# Patient Record
Sex: FEMALE | Race: WHITE | Employment: OTHER | URBAN - METROPOLITAN AREA
[De-identification: names, ages, dates, MRNs, and addresses within clinical notes are randomized per-mention and may not be internally consistent; named-entity substitution may affect disease eponyms.]

---

## 2017-03-02 ENCOUNTER — TRANSFERRED RECORDS (OUTPATIENT)
Dept: HEALTH INFORMATION MANAGEMENT | Facility: CLINIC | Age: 72
End: 2017-03-02

## 2017-03-20 ENCOUNTER — ONCOLOGY VISIT (OUTPATIENT)
Dept: ONCOLOGY | Facility: CLINIC | Age: 72
End: 2017-03-20
Attending: INTERNAL MEDICINE
Payer: MEDICARE

## 2017-03-20 ENCOUNTER — HOSPITAL ENCOUNTER (OUTPATIENT)
Facility: CLINIC | Age: 72
Setting detail: SPECIMEN
Discharge: HOME | End: 2017-03-20
Attending: INTERNAL MEDICINE | Admitting: INTERNAL MEDICINE
Payer: MEDICARE

## 2017-03-20 DIAGNOSIS — Z85.3 PERSONAL HISTORY OF MALIGNANT NEOPLASM OF BREAST: ICD-10-CM

## 2017-03-20 LAB
ALBUMIN SERPL-MCNC: 3.9 G/DL (ref 3.4–5)
ALP SERPL-CCNC: 133 U/L (ref 40–150)
ALT SERPL W P-5'-P-CCNC: 33 U/L (ref 0–50)
AST SERPL W P-5'-P-CCNC: 19 U/L (ref 0–45)
BILIRUB DIRECT SERPL-MCNC: <0.1 MG/DL (ref 0–0.2)
BILIRUB SERPL-MCNC: 0.3 MG/DL (ref 0.2–1.3)
PROT SERPL-MCNC: 6.7 G/DL (ref 6.8–8.8)

## 2017-03-20 PROCEDURE — 80076 HEPATIC FUNCTION PANEL: CPT | Performed by: INTERNAL MEDICINE

## 2017-03-20 PROCEDURE — 86300 IMMUNOASSAY TUMOR CA 15-3: CPT | Performed by: INTERNAL MEDICINE

## 2017-03-20 PROCEDURE — 36415 COLL VENOUS BLD VENIPUNCTURE: CPT

## 2017-03-20 NOTE — PROGRESS NOTES
Medical Assistant Note:  Capri Chacon presents today for labs.    Patient seen by provider today: No.   present during visit today: Not Applicable.    Concerns: No Concerns.    Procedure:  Lab draw site: LAC, Needle type: BF, Gauge: 21.    Post Assessment:  Labs drawn without difficulty: Yes.    Discharge Plan:  Departure Mode: Ambulatory.    Shirley Hernandez

## 2017-03-20 NOTE — MR AVS SNAPSHOT
After Visit Summary   3/20/2017    Capri Chacon    MRN: [id number]           Patient Information     Date Of Birth          1945        Visit Information        Provider Department      3/20/2017 8:15 AM Nurse, [facility]        Today's Diagnoses     Personal history of malignant neoplasm of breast           Follow-ups after your visit        Your next 10 appointments already scheduled     Mar 22, 2017  8:45 AM CDT   Return Visit with Kristan Lim MD   [facility] ([employer])    [facility]  [address]  [facility] [id number] [phone]              Who to contact     If you have questions or need follow up information about today's clinic visit or your schedule please contact [facility] directly at [phone].  Normal or non-critical lab and imaging results will be communicated to you by MyChart, letter or phone within 4 business days after the clinic has received the results. If you do not hear from us within 7 days, please contact the clinic through MyChart or phone. If you have a critical or abnormal lab result, we will notify you by phone as soon as possible.  Submit refill requests through BancABC or call your pharmacy and they will forward the refill request to us. Please allow 3 business days for your refill to be completed.          Additional Information About Your Visit        MyChart Information     BancABC gives you secure access to your electronic health record. If you see a primary care provider, you can also send messages to your care team and make appointments. If you have questions, please call your primary care clinic.  If you do not have a primary care provider, please call [phone] and they will assist you.        Care EveryWhere ID     This is your Care EveryWhere ID. This could be used by other organizations to access your [facility]  records  [id number]        Your Vitals Were     Last Period                   01/01/2000            Blood Pressure from Last 3 Encounters:   09/21/16 141/73   03/16/16 147/77   09/16/15 140/80    Weight from Last 3 Encounters:   09/21/16 64 kg (141 lb)   03/16/16 65.1 kg (143 lb 9.6 oz)   09/16/15 65.2 kg (143 lb 12.8 oz)              We Performed the Following     Ca27.29  breast tumor marker     Hepatic panel        Primary Care Provider Office Phone # Fax #    Aissatou Kay [phone]       [facility] [address] [facility] [address]        Thank you!     Thank you for choosing [facility]  for your care. Our goal is always to provide you with excellent care. Hearing back from our patients is one way we can continue to improve our services. Please take a few minutes to complete the written survey that you may receive in the mail after your visit with us. Thank you!             Your Updated Medication List - Protect others around you: Learn how to safely use, store and throw away your medicines at www.disposemymeds.org.          This list is accurate as of: 3/20/17  8:16 AM.  Always use your most recent med list.                   Brand Name Dispense Instructions for use    ADVAIR HFA IN      Inhale into the lungs daily       anastrozole 1 MG tablet    ARIMIDEX    90 tablet    Take 1 tablet (1 mg) by mouth daily       ASPIRIN PO      Take 81 mg by mouth daily       LOPRESSOR PO      Take 50 mg by mouth daily       simvastatin 40 MG tablet    ZOCOR     Take 20 mg by mouth At Bedtime

## 2017-03-21 LAB — CANCER AG27-29 SERPL-ACNC: 9 U/ML (ref 0–39)

## 2017-04-05 ENCOUNTER — ONCOLOGY VISIT (OUTPATIENT)
Dept: ONCOLOGY | Facility: CLINIC | Age: 72
End: 2017-04-05
Attending: INTERNAL MEDICINE
Payer: MEDICARE

## 2017-04-05 VITALS
TEMPERATURE: 98.1 F | SYSTOLIC BLOOD PRESSURE: 147 MMHG | HEART RATE: 89 BPM | BODY MASS INDEX: 27.87 KG/M2 | DIASTOLIC BLOOD PRESSURE: 76 MMHG | WEIGHT: 138 LBS | RESPIRATION RATE: 16 BRPM | OXYGEN SATURATION: 94 %

## 2017-04-05 DIAGNOSIS — E66.3 OVERWEIGHT: ICD-10-CM

## 2017-04-05 DIAGNOSIS — M85.80 OSTEOPENIA: ICD-10-CM

## 2017-04-05 DIAGNOSIS — Z85.3 PERSONAL HISTORY OF MALIGNANT NEOPLASM OF BREAST: Primary | ICD-10-CM

## 2017-04-05 PROCEDURE — 99211 OFF/OP EST MAY X REQ PHY/QHP: CPT

## 2017-04-05 PROCEDURE — 99214 OFFICE O/P EST MOD 30 MIN: CPT | Performed by: INTERNAL MEDICINE

## 2017-04-05 ASSESSMENT — PAIN SCALES - GENERAL: PAINLEVEL: NO PAIN (0)

## 2017-04-05 NOTE — MR AVS SNAPSHOT
After Visit Summary   4/5/2017    Capri Chacon    MRN: [id number]           Patient Information     Date Of Birth          1945        Visit Information        Provider Department      4/5/2017 2:45 PM Kristan Lim MD [facility]        Today's Diagnoses     Personal history of malignant neoplasm of breast    -  1    Osteopenia        Overweight          Care Instructions    6 months f/u with labs.   Obtain dexa from outside.         Follow-ups after your visit        Your next 10 appointments already scheduled     Oct 05, 2017 12:30 PM CDT   Return Visit with  Oncology Nurse   [facility] ([facility])    [facility]  [address]  [place] [address] [phone]            Oct 05, 2017  1:00 PM CDT   Return Visit with Kristan Lim MD   [facility] ([facility])    [facility]  [address]  [place] [address] [phone]              Future tests that were ordered for you today     Open Future Orders        Priority Expected Expires Ordered    Ca27.29  breast tumor marker Routine 10/1/2017 11/30/2017 4/5/2017    CBC with platelets differential Routine 10/1/2017 11/30/2017 4/5/2017    Hepatic panel Routine 10/1/2017 11/30/2017 4/5/2017            Who to contact     If you have questions or need follow up information about today's clinic visit or your schedule please contact [facility] directly at [phone].  Normal or non-critical lab and imaging results will be communicated to you by MyChart, letter or phone within 4 business days after the clinic has received the results. If you do not hear from us within 7 days, please contact the clinic through Sha-Shahart or phone. If you have a critical or abnormal lab result, we will notify you by phone as soon as possible.  Submit refill requests through [email or web address] or call your pharmacy and they will  forward the refill request to us. Please allow 3 business days for your refill to be completed.          Additional Information About Your Visit        BioSTLharHashtrack Information     SingOn gives you secure access to your electronic health record. If you see a primary care provider, you can also send messages to your care team and make appointments. If you have questions, please call your primary care clinic.  If you do not have a primary care provider, please call [phone] and they will assist you.        Care EveryWhere ID     This is your Care EveryWhere ID. This could be used by other organizations to access your [place] medical records  [id number]        Your Vitals Were     Pulse Temperature Respirations Last Period Pulse Oximetry BMI (Body Mass Index)    89 98.1  F (36.7  C) (Oral) 16 01/01/2000 94% 27.87 kg/m2       Blood Pressure from Last 3 Encounters:   04/05/17 147/76   09/21/16 141/73   03/16/16 147/77    Weight from Last 3 Encounters:   04/05/17 62.6 kg (138 lb)   09/21/16 64 kg (141 lb)   03/16/16 65.1 kg (143 lb 9.6 oz)               Primary Care Provider Office Phone # Fax #    Aissatou CHINO Rahul [phone]       [facility] [address] [facility] [address]        Thank you!     Thank you for choosing [facility]  for your care. Our goal is always to provide you with excellent care. Hearing back from our patients is one way we can continue to improve our services. Please take a few minutes to complete the written survey that you may receive in the mail after your visit with us. Thank you!             Your Updated Medication List - Protect others around you: Learn how to safely use, store and throw away your medicines at www.disposemymeds.org.          This list is accurate as of: 4/5/17 11:59 PM.  Always use your most recent med list.                   Brand Name Dispense Instructions for use    ADVAIR HFA IN      Inhale into the lungs daily        anastrozole 1 MG tablet    ARIMIDEX    90 tablet    Take 1 tablet (1 mg) by mouth daily       ASPIRIN PO      Take 81 mg by mouth daily       LOPRESSOR PO      Take 50 mg by mouth daily       simvastatin 40 MG tablet    ZOCOR     Take 20 mg by mouth At Bedtime

## 2017-04-05 NOTE — PROGRESS NOTES
"Oncology follow up visit  CC: right breast cancer T1b triple positive  HPI: She had undergone routine screening mammogram on 03/07/2012 demonstrating a suspicious lesion in the right breast. Biopsy confirmed infiltrating ductal adenocarcinoma. She underwent a right mastectomy on 04/11/2012 demonstrating a 0.9 x 0.7 cm, grade 2 infiltrating ductal adenocarcinoma. Four examined lymph nodes were negative for metastatic disease. There was no lymphovascular invasion or DCIS. Estrogen and progesterone receptors were strongly positive. HER-2/zechariah was positive by FISH with a ratio of 6.1.     She went on to receive 6 cycles of TCH chemotherapy from 05/13/2012 through 08/16/2012. She then completed a full year of Herceptin on 04/25/2013.   She was placed on anastrozole on 09/20/2012 because of hormone positive disease.       PMH:  bilateral carpal tunnel syndrome. This is being observed at the present time without surgery planned.   Osteopenia, HTN and hyperlipidemia, asthma, left breast mastectomy in 1996 for \"micalcification\" according to pt    ROS:   she is tolerating Arimidex well without added pain to her preexisting arthritis. She has episodic pain on right frontal ankle. No focal weakness or bleeding issues.     PHYSICAL EXAMINATION:   GENERAL: Capri is well.   VITAL SIGNS: Blood pressure 147/76, pulse 89, temperature 98.1  F (36.7  C), temperature source Oral, resp. rate 16, weight 62.6 kg (138 lb), last menstrual period 01/01/2000, SpO2 94 %.  HEENT: The patient is normocephalic. Pupils are equal, round, and react to light and accommodation.   NECK: Supple. No JVD  LYMPH NODES: No anterior cervical, posterior cervical, supraclavicular, axillary or inguinal adenopathy.   LUNGS: Normal breath sounds to auscultation and percussion. No rhonchi or rales.   BACK: No CVA or vertebral tenderness.   HEART: Regular rhythm without murmurs or gallops.   BREASTS: Bilateral mastectomies scars well healed  ABDOMEN: Without " hepatosplenomegaly, masses, or tenderness. The patient has normal bowel sounds. No rigidity, guarding or tenderness.   EXTREMITIES: The patient has full range of motion. There is no lymphedema.   NEUROLOGIC: Cranial nerves II-XII are intact. Sensation is intact    CURRENT LAB DATA  3/2017  LFT, [id number]. LFT are fine      CURRENT IMAGE   3/2017 dexa from outside: nl.     OLD DATA REVIEWED WITH SUMMARY  2/2016 CXR nl  2/2015 dexa: borderline osteopenia  2012 dexa: osteopenia      A/P  1. Right breast cancer in 2012, T1b, triple positive, s/p mastectomy, [facility], currently on Arimidex since 2012.  We talked about the side effects of this and how she should be monitored.     She is due 6 months f/u with labs.     I shared with her the MA 17R data from [facility] 2016. She could be a good candidate for long term anti hormone therapy. She is open for this.     We talked about the recurrence pattern associated with ER+ breast cancer and the follow up plan. She is informed on the purpose of blood work along with her visit.     2. Osteopenia. She finished 6 doses of prolia. She is advised on vit D proper dose. She had another dexa spring 2017 from outside which was nl. Will get the result.     3. Overweight. Weight loss counseling is provided. She is on top of this.

## 2017-04-05 NOTE — PROGRESS NOTES
"Capri Chacon is a 71 year old female who presents for:  Chief Complaint   Patient presents with     Oncology Clinic Visit     F/U Breast Ca         Initial Vitals:  /76  Pulse 89  Temp 98.1  F (36.7  C) (Oral)  Resp 16  Wt 62.6 kg (138 lb)  LMP 01/01/2000  SpO2 94%  BMI 27.87 kg/m2 Estimated body mass index is 27.87 kg/(m^2) as calculated from the following:    Height as of 11/13/14: 1.499 m (4' 11\").    Weight as of this encounter: 62.6 kg (138 lb).. Body surface area is 1.61 meters squared. BP completed using cuff size: regular  No Pain (0) Patient's last menstrual period was 01/01/2000. Allergies and medications reviewed.     Medications: MEDICATION REFILLS NEEDED TODAY.  Pharmacy name entered into [employer]: [facility] - [place], [place] - [address] AT PORTAL TO REGISTERED [facility] SITES    Comments: Needs Arimidex refill    6 minutes for nursing intake (face to face time)   Shirley Hernandez CMA    DISCHARGE PLAN:  1.) Patient to be scheduled for labs and follow up in 6 months with Dr. Lim.   2.) Bone density report to be obtaind. Bone density obtained through care everywhere and pasted below.   Next appointments: See patient instruction section  Departure Mode: Ambulatory  Accompanied by: sister  8 minutes for nursing discharge (face to face time)   Aissatou Jones RN      IMPRESSION:     Normal bone mineral density in the lumbar spine and hip. Findings in the left femoral neck fall just short of meeting criteria for osteopenia.          WHO Classification of bone density for post-menopausal women, and men over 50 years of age, is as follows:    Normal bone density: T score greater than -1.0.  Osteopenia (Low bone density): T score between -1.0 and -2.5.  Osteoporosis: T score less than -2.5.     Result Narrative   EXAM: DEXA Bone Density Study     DATE: 3/2/2017 10:06 AM    COMPARISON: 3/2/2015    CLINICAL DATA: Osteoporosis screening. Post-menopausal.    TECHNIQUE: A bone " density evaluation using DEXA (dual energy x-ray absorptiometry) was performed for your patient at the [facility] in [place] using a Hologic (Discovery) unit.     FINDINGS:     Lumbar Spine:    Average BMD (g/cm2): 1.052    T-score: 0.0    Z-score: 2.2      Left Femoral Neck:    Average BMD (g/cm2): 0.739    T-score: -1.0    Z-score: 0.8      Left Hip Total:    Average BMD (g/cm2): 0.893    T-score: -0.4    Z-score: 1.1      Vertebral assessment: frontal and lateral scanograms demonstrate no compression deformities in the thoracic or lumbar spine. Compared to the previous study, the measured bone density in the lumbar spine has decreased by 4% and the measured bone density in the left hip is unchanged.    FRAX (10-year Fracture Risk calculated for an untreated patient): not reported in patients being treated for low bone mineral density.

## 2017-10-11 ENCOUNTER — HOSPITAL ENCOUNTER (OUTPATIENT)
Facility: CLINIC | Age: 72
Setting detail: SPECIMEN
Discharge: HOME | End: 2017-10-11
Attending: INTERNAL MEDICINE | Admitting: INTERNAL MEDICINE
Payer: MEDICARE

## 2017-10-11 ENCOUNTER — ONCOLOGY VISIT (OUTPATIENT)
Dept: ONCOLOGY | Facility: CLINIC | Age: 72
End: 2017-10-11
Attending: INTERNAL MEDICINE
Payer: MEDICARE

## 2017-10-11 VITALS
TEMPERATURE: 98.5 F | BODY MASS INDEX: 27.63 KG/M2 | SYSTOLIC BLOOD PRESSURE: 151 MMHG | WEIGHT: 136.8 LBS | OXYGEN SATURATION: 99 % | HEART RATE: 66 BPM | DIASTOLIC BLOOD PRESSURE: 90 MMHG

## 2017-10-11 DIAGNOSIS — Z87.39 HISTORY OF OSTEOPENIA: ICD-10-CM

## 2017-10-11 DIAGNOSIS — E66.3 OVERWEIGHT: ICD-10-CM

## 2017-10-11 DIAGNOSIS — Z85.3 PERSONAL HISTORY OF MALIGNANT NEOPLASM OF BREAST: Primary | ICD-10-CM

## 2017-10-11 DIAGNOSIS — Z85.3 PERSONAL HISTORY OF MALIGNANT NEOPLASM OF BREAST: ICD-10-CM

## 2017-10-11 LAB
ALBUMIN SERPL-MCNC: 3.8 G/DL (ref 3.4–5)
ALP SERPL-CCNC: 110 U/L (ref 40–150)
ALT SERPL W P-5'-P-CCNC: 28 U/L (ref 0–50)
AST SERPL W P-5'-P-CCNC: 16 U/L (ref 0–45)
BASOPHILS # BLD AUTO: 0 10E9/L (ref 0–0.2)
BASOPHILS NFR BLD AUTO: 0.1 %
BILIRUB DIRECT SERPL-MCNC: <0.1 MG/DL (ref 0–0.2)
BILIRUB SERPL-MCNC: 0.4 MG/DL (ref 0.2–1.3)
CANCER AG27-29 SERPL-ACNC: 8 U/ML (ref 0–39)
DIFFERENTIAL METHOD BLD: NORMAL
EOSINOPHIL # BLD AUTO: 0.5 10E9/L (ref 0–0.7)
EOSINOPHIL NFR BLD AUTO: 6 %
ERYTHROCYTE [DISTWIDTH] IN BLOOD BY AUTOMATED COUNT: 13.5 % (ref 10–15)
HCT VFR BLD AUTO: 39.4 % (ref 35–47)
HGB BLD-MCNC: 13.5 G/DL (ref 11.7–15.7)
IMM GRANULOCYTES # BLD: 0 10E9/L (ref 0–0.4)
IMM GRANULOCYTES NFR BLD: 0.3 %
LYMPHOCYTES # BLD AUTO: 2.2 10E9/L (ref 0.8–5.3)
LYMPHOCYTES NFR BLD AUTO: 28 %
MCH RBC QN AUTO: 31.3 PG (ref 26.5–33)
MCHC RBC AUTO-ENTMCNC: 34.3 G/DL (ref 31.5–36.5)
MCV RBC AUTO: 91 FL (ref 78–100)
MONOCYTES # BLD AUTO: 0.7 10E9/L (ref 0–1.3)
MONOCYTES NFR BLD AUTO: 8.4 %
NEUTROPHILS # BLD AUTO: 4.6 10E9/L (ref 1.6–8.3)
NEUTROPHILS NFR BLD AUTO: 57.2 %
NRBC # BLD AUTO: 0 10*3/UL
NRBC BLD AUTO-RTO: 0 /100
PLATELET # BLD AUTO: 249 10E9/L (ref 150–450)
PROT SERPL-MCNC: 6.7 G/DL (ref 6.8–8.8)
RBC # BLD AUTO: 4.31 10E12/L (ref 3.8–5.2)
WBC # BLD AUTO: 8 10E9/L (ref 4–11)

## 2017-10-11 PROCEDURE — 80076 HEPATIC FUNCTION PANEL: CPT | Performed by: INTERNAL MEDICINE

## 2017-10-11 PROCEDURE — 86300 IMMUNOASSAY TUMOR CA 15-3: CPT | Performed by: INTERNAL MEDICINE

## 2017-10-11 PROCEDURE — 99214 OFFICE O/P EST MOD 30 MIN: CPT | Performed by: INTERNAL MEDICINE

## 2017-10-11 PROCEDURE — 85025 COMPLETE CBC W/AUTO DIFF WBC: CPT | Performed by: INTERNAL MEDICINE

## 2017-10-11 PROCEDURE — 99211 OFF/OP EST MAY X REQ PHY/QHP: CPT

## 2017-10-11 PROCEDURE — 36415 COLL VENOUS BLD VENIPUNCTURE: CPT

## 2017-10-11 RX ORDER — ANASTROZOLE 1 MG/1
1 TABLET ORAL DAILY
Qty: 90 TABLET | Refills: 3 | Status: SHIPPED | OUTPATIENT
Start: 2017-10-11 | End: 2019-01-15

## 2017-10-11 ASSESSMENT — PAIN SCALES - GENERAL: PAINLEVEL: NO PAIN (0)

## 2017-10-11 NOTE — PROGRESS NOTES
"Oncology Rooming Note    October 11, 2017 10:26 AM   Capri Chacon is a 71 year old female who presents for:    Chief Complaint   Patient presents with     Oncology Clinic Visit     Initial Vitals: /90 (BP Location: Left arm, Patient Position: Chair, Cuff Size: Adult Regular)  Pulse 66  Temp 98.5  F (36.9  C) (Oral)  Wt 62.1 kg (136 lb 12.8 oz)  LMP 01/01/2000  SpO2 99%  BMI 27.63 kg/m2 Estimated body mass index is 27.63 kg/(m^2) as calculated from the following:    Height as of 11/13/14: 1.499 m (4' 11\").    Weight as of this encounter: 62.1 kg (136 lb 12.8 oz). Body surface area is 1.61 meters squared.  No Pain (0) Comment: Data Unavailable   Patient's last menstrual period was 01/01/2000.  Allergies reviewed: Yes  Medications reviewed: Yes    Medications: MEDICATION REFILLS NEEDED TODAY. Provider was notified. ANASTROZOLE REFILL NEEDED TODAY.  Pharmacy name entered into [employer]: [facility] - [place], [place] - [address] AT PORTAL TO [facility] SITES    Clinical concerns: None     5 minutes for nursing intake (face to face time)     Mirela Bailey CMA            DISCHARGE PLAN:  Next appointments: See patient instruction section. Pt instructions reviewed with pt. Pt brought to [facility] to schedule lab and follow up.  Departure Mode: Ambulatory  Accompanied by: self  3 minutes for nursing discharge (face to face time)   Mirela Bailey CMA        1 yr f/u with labs  Scheduled/Odette VILLEGAS printed and given to patient/Odette        10/10/2018 Wed  8:30 AM  8:30 A 15 [facility] [[id number]] SH ONCOLOGY NURSE [[id number]] RETURN [657] Peripheral Labs       10/10/2018 Wed  9:00 AM  9:00 A 15 [facility] [[id number]] JOSE L FALK [[id number]] RETURN [657] 1 year follow up -Breast Cancer       "

## 2017-10-11 NOTE — MR AVS SNAPSHOT
After Visit Summary   10/11/2017    Capri Chacon    MRN: [id number]           Patient Information     Date Of Birth          1945        Visit Information        Provider Department      10/11/2017 10:15 AM Kristan Lim MD [facility]        Today's Diagnoses     Personal history of malignant neoplasm of breast    -  1    Overweight        History of osteopenia          Care Instructions    1 yr f/u with labs          Follow-ups after your visit        Your next 10 appointments already scheduled     Oct 10, 2018  8:30 AM CDT   Return Visit with  Oncology Nurse   [facility] ([facility])    [facility]  [address]  [place] [address]   [phone]            Oct 10, 2018  9:00 AM CDT   Return Visit with Kristan Lim MD   [facility] ([employer])    [facility] [place]  [address]  [place] [address]   [phone]              Future tests that were ordered for you today     Open Future Orders        Priority Expected Expires Ordered    Ca27.29  breast tumor marker Routine 9/1/2018 10/11/2018 10/11/2017    CBC with platelets differential Routine 9/1/2018 10/11/2018 10/11/2017    Comprehensive metabolic panel Routine 9/1/2018 10/11/2018 10/11/2017            Who to contact     If you have questions or need follow up information about today's clinic visit or your schedule please contact [facility] directly at [phone].  Normal or non-critical lab and imaging results will be communicated to you by MyChart, letter or phone within 4 business days after the clinic has received the results. If you do not hear from us within 7 days, please contact the clinic through Agilis Biotherapeuticshart or phone. If you have a critical or abnormal lab result, we will notify you by phone as soon as possible.  Submit refill requests through Parature or call your pharmacy and they  will forward the refill request to us. Please allow 3 business days for your refill to be completed.          Additional Information About Your Visit        Tritonhart Information     BetterPet gives you secure access to your electronic health record. If you see a primary care provider, you can also send messages to your care team and make appointments. If you have questions, please call your primary care clinic.  If you do not have a primary care provider, please call [phone] and they will assist you.        Care EveryWhere ID     This is your Care EveryWhere ID. This could be used by other organizations to access your [place] medical records  [id number]        Your Vitals Were     Pulse Temperature Last Period Pulse Oximetry BMI (Body Mass Index)       66 98.5  F (36.9  C) (Oral) 01/01/2000 99% 27.63 kg/m2        Blood Pressure from Last 3 Encounters:   10/11/17 151/90   04/05/17 147/76   09/21/16 141/73    Weight from Last 3 Encounters:   10/11/17 62.1 kg (136 lb 12.8 oz)   04/05/17 62.6 kg (138 lb)   09/21/16 64 kg (141 lb)                 Where to get your medicines      [facility] [place] [place] - [address] AT Portal to [facility]  [address], [facility] [address]     Phone:  [phone]     anastrozole 1 MG tablet          Primary Care Provider Office Phone # Fax #    Aissatou CHINO Rahul [phone]       [facility] [address] [place] [address]        Equal Access to Services     JESSICA REYES : Hadii jamey tapia hadasho Soomaali, waaxda luqadaha, qaybta kaalmada lillie, bret de guzman. So [facility] [phone].    ATENCIÓN: Si habla español, tiene a gustafson disposición servicios gratuitos de asistencia lingüística. Llame al [phone].    We comply with applicable federal civil rights laws and [place] laws. We do not discriminate on the basis of race, color,  national origin, age, disability, sex, sexual orientation, or gender identity.            Thank you!     Thank you for choosing [facility]  for your care. Our goal is always to provide you with excellent care. Hearing back from our patients is one way we can continue to improve our services. Please take a few minutes to complete the written survey that you may receive in the mail after your visit with us. Thank you!             Your Updated Medication List - Protect others around you: Learn how to safely use, store and throw away your medicines at www.disposemymeds.org.          This list is accurate as of: 10/11/17 10:48 AM.  Always use your most recent med list.                   Brand Name Dispense Instructions for use Diagnosis    ADVAIR HFA IN      Inhale into the lungs daily        anastrozole 1 MG tablet    ARIMIDEX    90 tablet    Take 1 tablet (1 mg) by mouth daily    Personal history of malignant neoplasm of breast       ASPIRIN PO      Take 81 mg by mouth daily        LOPRESSOR PO      Take 50 mg by mouth daily        simvastatin 40 MG tablet    ZOCOR     Take 20 mg by mouth At Bedtime

## 2017-10-11 NOTE — PROGRESS NOTES
"Oncology follow up visit  CC: right breast cancer T1b triple positive  HPI: She had undergone routine screening mammogram on 03/07/2012 demonstrating a suspicious lesion in the right breast. Biopsy confirmed infiltrating ductal adenocarcinoma. She underwent a right mastectomy on 04/11/2012 demonstrating a 0.9 x 0.7 cm, grade 2 infiltrating ductal adenocarcinoma. Four examined lymph nodes were negative for metastatic disease. There was no lymphovascular invasion or DCIS. Estrogen and progesterone receptors were strongly positive. HER-2/zechariah was positive by FISH with a ratio of 6.1.     She went on to receive 6 cycles of TCH chemotherapy from 05/13/2012 through 08/16/2012. She then completed a full year of Herceptin on 04/25/2013.   She was placed on anastrozole on 09/20/2012 because of hormone positive disease.   She made informed decision to proceed with longer anti hormone therapy in 2017.     PMH:  bilateral carpal tunnel syndrome. This is being observed at the present time without surgery planned.   Osteopenia, HTN and hyperlipidemia, asthma, left breast mastectomy in 1996 for \"micalcification\" according to pt    ROS:   she is tolerating Arimidex well without added pain to her preexisting arthritis. She has episodic pain on right frontal ankle. No focal weakness or bleeding issues.     PHYSICAL EXAMINATION:   GENERAL: Capri is well.   VITAL SIGNS: Blood pressure 151/90, pulse 66, temperature 98.5  F (36.9  C), temperature source Oral, weight 62.1 kg (136 lb 12.8 oz), last menstrual period 01/01/2000, SpO2 99 %.  HEENT: The patient is normocephalic. Pupils are equal, round, and react to light and accommodation.   NECK: Supple. No JVD  LYMPH NODES: No anterior cervical, posterior cervical, supraclavicular, axillary or inguinal adenopathy.   LUNGS: Normal breath sounds to auscultation and percussion. No rhonchi or rales.   BACK: No CVA or vertebral tenderness.   HEART: Regular rhythm without murmurs or gallops. "   BREASTS: Bilateral mastectomies scars well healed  ABDOMEN: Without hepatosplenomegaly, masses, or tenderness. The patient has normal bowel sounds. No rigidity, guarding or tenderness.   EXTREMITIES: The patient has full range of motion. There is no lymphedema.   NEUROLOGIC: Cranial nerves II-XII are intact. Sensation is intact    CURRENT LAB DATA REVIEWED  10/2017  LFT, Cl5426. LFT are fine      CURRENT IMAGE REVIEWED  3/2017 dexa from outside: nl.     OLD DATA REVIEWED WITH SUMMARY  2/2016 CXR nl  2/2015 dexa: borderline osteopenia  2012 dexa: osteopenia      A/P  1. Right breast cancer in 2012, T1b, triple positive, s/p mastectomy, TC, currently on Arimidex since 2012.  We talked about the side effects of this and how she should be monitored.     She is due 12 months f/u with labs.     I shared with her the long term anti hormone data. She could be a good candidate for long term anti hormone therapy. She is open for this.   She is willing to do that.     We talked about the recurrence pattern associated with ER+ breast cancer and the follow up plan. She is informed on the purpose of blood work along with her visit.     2. HX OF Osteopenia. She finished 6 doses of prolia. She is advised on vit D proper dose. She had another dexa spring 2017 from outside which was nl. Will get the result.     3. Overweight. Weight loss counseling is provided. She is on top of this.

## 2017-10-11 NOTE — PROGRESS NOTES
Medical Assistant Note:  Capri Chacon presents today for labs.    Patient seen by provider today: Yes: GE.   present during visit today: Not Applicable.    Concerns: No Concerns.    Procedure:  Lab draw site: LAC, Needle type: BF, Gauge: 21.    Post Assessment:  Labs drawn without difficulty: Yes.    Discharge Plan:  Pt tolerated procedure well. Gauze and coban applied.    Face to Face Time: 8min.    Letitia Lemon MA

## 2017-10-11 NOTE — MR AVS SNAPSHOT
After Visit Summary   10/11/2017    Capri Chacon    MRN: [id number]           Patient Information     Date Of Birth          1945        Visit Information        Provider Department      10/11/2017 9:45 AM Nurse,  Oncology [facility]        Today's Diagnoses     Personal history of malignant neoplasm of breast           Follow-ups after your visit        Your next 10 appointments already scheduled     Oct 11, 2017  9:45 AM CDT   Return Visit with  Oncology Nurse   [facility] ([facility])    [facility]  [address]  [facility] [address]   [phone]            Oct 11, 2017 10:15 AM CDT   Return Visit with Kristan iLm MD   [facility] ([facility])    [facility]  [address]  [facility] [address] [phone]              Who to contact     If you have questions or need follow up information about today's clinic visit or your schedule please contact [facility] directly at [phone].  Normal or non-critical lab and imaging results will be communicated to you by [employer], letter or phone within 4 business days after the clinic has received the results. If you do not hear from us within 7 days, please contact the clinic through [employer] or phone. If you have a critical or abnormal lab result, we will notify you by phone as soon as possible.  Submit refill requests through [employer] or call your pharmacy and they will forward the refill request to us. Please allow 3 business days for your refill to be completed.          Additional Information About Your Visit        [employer] Information     [employer] gives you secure access to your electronic health record. If you see a primary care provider, you can also send messages to your care team and make appointments. If you have questions, please call your primary care clinic.  If you do not have a  primary care provider, please call [phone] and they will assist you.        Care EveryWhere ID     This is your Care EveryWhere ID. This could be used by other organizations to access your [place] medical records  [id number]        Your Vitals Were     Last Period                   01/01/2000            Blood Pressure from Last 3 Encounters:   04/05/17 147/76   09/21/16 141/73   03/16/16 147/77    Weight from Last 3 Encounters:   04/05/17 62.6 kg (138 lb)   09/21/16 64 kg (141 lb)   03/16/16 65.1 kg (143 lb 9.6 oz)              We Performed the Following     Ca27.29  breast tumor marker     CBC with platelets differential     Hepatic panel        Primary Care Provider Office Phone # Fax #    Aissatou Kay [phone]       [facility] [address] [facility] [address]        Equal Access to Services     YASMEEN REYES : Hadii aad ku hadasho Soomaali, waaxda luqadaha, qaybta kaalmada adeegyada, waxay idiin haybarryn roro solis . So [facility] [phone].    ATENCIÓN: Si habla español, tiene a gustafson disposición servicios gratuitos de asistencia lingüística. Russel al [phone].    We comply with applicable federal civil rights laws and [place] laws. We do not discriminate on the basis of race, color, national origin, age, disability, sex, sexual orientation, or gender identity.            Thank you!     Thank you for choosing [facility]  for your care. Our goal is always to provide you with excellent care. Hearing back from our patients is one way we can continue to improve our services. Please take a few minutes to complete the written survey that you may receive in the mail after your visit with us. Thank you!             Your Updated Medication List - Protect others around you: Learn how to safely use, store and throw away your medicines at www.disposemymeds.org.          This list is accurate as of: 10/11/17  9:42 AM.  Always use your most recent  med list.                   Brand Name Dispense Instructions for use Diagnosis    ADVAIR HFA IN      Inhale into the lungs daily        anastrozole 1 MG tablet    ARIMIDEX    90 tablet    Take 1 tablet (1 mg) by mouth daily    Personal history of malignant neoplasm of breast       ASPIRIN PO      Take 81 mg by mouth daily        LOPRESSOR PO      Take 50 mg by mouth daily        simvastatin 40 MG tablet    ZOCOR     Take 20 mg by mouth At Bedtime

## 2018-01-24 ENCOUNTER — TELEPHONE (OUTPATIENT)
Dept: ONCOLOGY | Facility: CLINIC | Age: 73
End: 2018-01-24

## 2018-01-24 NOTE — TELEPHONE ENCOUNTER
Capri would like a new script for 12 bras and bilateral prosthesis faxed to [facility] at [phone].    2/2/- Patient called, she is aware that script for bras and breast prothesis were faxed to [facility]. Aissatou Jones RN

## 2018-02-02 DIAGNOSIS — Z90.11 ABSENCE OF RIGHT BREAST: Primary | ICD-10-CM

## 2018-10-10 ENCOUNTER — ONCOLOGY VISIT (OUTPATIENT)
Dept: ONCOLOGY | Facility: CLINIC | Age: 73
End: 2018-10-10
Attending: INTERNAL MEDICINE
Payer: MEDICARE

## 2018-10-10 ENCOUNTER — HOSPITAL ENCOUNTER (OUTPATIENT)
Facility: CLINIC | Age: 73
Setting detail: SPECIMEN
Discharge: HOME | End: 2018-10-10
Attending: INTERNAL MEDICINE | Admitting: INTERNAL MEDICINE
Payer: MEDICARE

## 2018-10-10 VITALS
WEIGHT: 140.2 LBS | RESPIRATION RATE: 16 BRPM | SYSTOLIC BLOOD PRESSURE: 195 MMHG | DIASTOLIC BLOOD PRESSURE: 73 MMHG | BODY MASS INDEX: 28.32 KG/M2 | TEMPERATURE: 97.9 F | OXYGEN SATURATION: 94 % | HEART RATE: 55 BPM

## 2018-10-10 DIAGNOSIS — Z87.39 HISTORY OF OSTEOPENIA: ICD-10-CM

## 2018-10-10 DIAGNOSIS — I10 ESSENTIAL HYPERTENSION: ICD-10-CM

## 2018-10-10 DIAGNOSIS — I10 HTN (HYPERTENSION): Primary | ICD-10-CM

## 2018-10-10 DIAGNOSIS — Z85.3 PERSONAL HISTORY OF MALIGNANT NEOPLASM OF BREAST: ICD-10-CM

## 2018-10-10 DIAGNOSIS — Z85.3 PERSONAL HISTORY OF MALIGNANT NEOPLASM OF BREAST: Primary | ICD-10-CM

## 2018-10-10 LAB
ALBUMIN SERPL-MCNC: 3.9 G/DL (ref 3.4–5)
ALP SERPL-CCNC: 84 U/L (ref 40–150)
ALT SERPL W P-5'-P-CCNC: 28 U/L (ref 0–50)
ANION GAP SERPL CALCULATED.3IONS-SCNC: 5 MMOL/L (ref 3–14)
AST SERPL W P-5'-P-CCNC: 19 U/L (ref 0–45)
BASOPHILS # BLD AUTO: 0 10E9/L (ref 0–0.2)
BASOPHILS NFR BLD AUTO: 0.4 %
BILIRUB SERPL-MCNC: 0.6 MG/DL (ref 0.2–1.3)
BUN SERPL-MCNC: 23 MG/DL (ref 7–30)
CALCIUM SERPL-MCNC: 8.9 MG/DL (ref 8.5–10.1)
CANCER AG27-29 SERPL-ACNC: 10 U/ML (ref 0–39)
CHLORIDE SERPL-SCNC: 105 MMOL/L (ref 94–109)
CO2 SERPL-SCNC: 30 MMOL/L (ref 20–32)
CREAT SERPL-MCNC: 0.83 MG/DL (ref 0.52–1.04)
DIFFERENTIAL METHOD BLD: NORMAL
EOSINOPHIL # BLD AUTO: 0.5 10E9/L (ref 0–0.7)
EOSINOPHIL NFR BLD AUTO: 5.5 %
ERYTHROCYTE [DISTWIDTH] IN BLOOD BY AUTOMATED COUNT: 13.2 % (ref 10–15)
GFR SERPL CREATININE-BSD FRML MDRD: 68 ML/MIN/1.7M2
GLUCOSE SERPL-MCNC: 108 MG/DL (ref 70–99)
HCT VFR BLD AUTO: 39.1 % (ref 35–47)
HGB BLD-MCNC: 13 G/DL (ref 11.7–15.7)
IMM GRANULOCYTES # BLD: 0 10E9/L (ref 0–0.4)
IMM GRANULOCYTES NFR BLD: 0.2 %
LYMPHOCYTES # BLD AUTO: 2.5 10E9/L (ref 0.8–5.3)
LYMPHOCYTES NFR BLD AUTO: 29 %
MCH RBC QN AUTO: 30.9 PG (ref 26.5–33)
MCHC RBC AUTO-ENTMCNC: 33.2 G/DL (ref 31.5–36.5)
MCV RBC AUTO: 93 FL (ref 78–100)
MONOCYTES # BLD AUTO: 0.8 10E9/L (ref 0–1.3)
MONOCYTES NFR BLD AUTO: 9.1 %
NEUTROPHILS # BLD AUTO: 4.7 10E9/L (ref 1.6–8.3)
NEUTROPHILS NFR BLD AUTO: 55.8 %
NRBC # BLD AUTO: 0 10*3/UL
NRBC BLD AUTO-RTO: 0 /100
PLATELET # BLD AUTO: 232 10E9/L (ref 150–450)
POTASSIUM SERPL-SCNC: 4 MMOL/L (ref 3.4–5.3)
PROT SERPL-MCNC: 6.5 G/DL (ref 6.8–8.8)
RBC # BLD AUTO: 4.21 10E12/L (ref 3.8–5.2)
SODIUM SERPL-SCNC: 140 MMOL/L (ref 133–144)
WBC # BLD AUTO: 8.4 10E9/L (ref 4–11)

## 2018-10-10 PROCEDURE — 99215 OFFICE O/P EST HI 40 MIN: CPT | Performed by: INTERNAL MEDICINE

## 2018-10-10 PROCEDURE — G0463 HOSPITAL OUTPT CLINIC VISIT: HCPCS

## 2018-10-10 PROCEDURE — 86300 IMMUNOASSAY TUMOR CA 15-3: CPT | Performed by: INTERNAL MEDICINE

## 2018-10-10 PROCEDURE — 80053 COMPREHEN METABOLIC PANEL: CPT | Performed by: INTERNAL MEDICINE

## 2018-10-10 PROCEDURE — 85025 COMPLETE CBC W/AUTO DIFF WBC: CPT | Performed by: INTERNAL MEDICINE

## 2018-10-10 PROCEDURE — 36415 COLL VENOUS BLD VENIPUNCTURE: CPT

## 2018-10-10 RX ORDER — FUROSEMIDE 20 MG
20 TABLET ORAL ONCE
Qty: 1 TABLET | Refills: 0 | Status: SHIPPED | OUTPATIENT
Start: 2018-10-10 | End: 2020-04-10

## 2018-10-10 RX ORDER — AMLODIPINE BESYLATE 5 MG/1
5 TABLET ORAL ONCE
Qty: 1 TABLET | Refills: 0 | Status: SHIPPED | OUTPATIENT
Start: 2018-10-10 | End: 2018-10-10 | Stop reason: DRUGHIGH

## 2018-10-10 ASSESSMENT — PAIN SCALES - GENERAL: PAINLEVEL: NO PAIN (0)

## 2018-10-10 NOTE — MR AVS SNAPSHOT
After Visit Summary   10/10/2018    Capri Chacon    MRN: [id number]           Patient Information     Date Of Birth          1945        Visit Information        Provider Department      10/10/2018 9:00 AM Kristan Lim MD [facility]        Today's Diagnoses     Personal history of malignant neoplasm of breast    -  1    History of osteopenia        Essential hypertension          Care Instructions    BP too high to be discharged. Please recheck. DONE , patient aware to f/u with primary clinic for BP check CY  1 yr f/u with labs. Scheduled/[place]     AVS printed and given to patient/ Latnoia           Follow-ups after your visit        Your next 10 appointments already scheduled     Oct 09, 2019  8:30 AM CDT   Return Visit with  Oncology Nurse   [facility] ([facility])    [facility]  [address]  [place] [address]   [phone]            Oct 09, 2019  9:40 AM CDT   Return Visit with Kristan Lim MD   [facility] ([facility])    [facility]  [address]  [place] [address]   [phone]              Future tests that were ordered for you today     Open Future Orders        Priority Expected Expires Ordered    Ca27.29  breast tumor marker Routine 9/1/2019 10/10/2019 10/10/2018    CBC with platelets differential Routine 9/1/2019 10/10/2019 10/10/2018    Comprehensive metabolic panel Routine 9/1/2019 10/10/2019 10/10/2018            Who to contact     If you have questions or need follow up information about today's clinic visit or your schedule please contact [facility] directly at [phone].  Normal or non-critical lab and imaging results will be communicated to you by MyChart, letter or phone within 4 business days after the clinic has received the results. If you do not hear from us within 7 days, please contact the clinic through  [employer] or phone. If you have a critical or abnormal lab result, we will notify you by phone as soon as possible.  Submit refill requests through [employer] or call your pharmacy and they will forward the refill request to us. Please allow 3 business days for your refill to be completed.          Additional Information About Your Visit        [employer] Information     [employer] gives you secure access to your electronic health record. If you see a primary care provider, you can also send messages to your care team and make appointments. If you have questions, please call your primary care clinic.  If you do not have a primary care provider, please call [phone] and they will assist you.        Care EveryWhere ID     This is your Care EveryWhere ID. This could be used by other organizations to access your [facility] medical records  [id number]        Your Vitals Were     Pulse Temperature Respirations Last Period Pulse Oximetry BMI (Body Mass Index)    55 97.9  F (36.6  C) (Oral) 16 01/01/2000 94% 28.32 kg/m2       Blood Pressure from Last 3 Encounters:   10/10/18 195/73   10/11/17 151/90   04/05/17 147/76    Weight from Last 3 Encounters:   10/10/18 63.6 kg (140 lb 3.2 oz)   10/11/17 62.1 kg (136 lb 12.8 oz)   04/05/17 62.6 kg (138 lb)                 Today's Medication Changes          These changes are accurate as of 10/10/18 11:16 AM.  If you have any questions, ask your nurse or doctor.               Start taking these medicines.        Dose/Directions    furosemide 20 MG tablet   Commonly known as:  LASIX   Used for:  HTN (hypertension), Personal history of malignant neoplasm of breast   Started by:  [facility]        Dose:  20 mg   Take 1 tablet (20 mg) by mouth once for 1 dose   Quantity:  1 tablet   Refills:  0            Where to get your medicines      These medications were sent to [facility] - [address]  [address] [address]     Phone:   [phone]     furosemide 20 MG tablet                Primary Care Provider Office Phone # Fax #    Aissatou Kay [phone]       [facility] [address] [facility] [address]        Equal Access to Services     YASMEEN REYES : Hadii jamey tapia hadbarbarao Soomaali, waaxda luqadaha, qaybta kaalmada adeegyada, waxay premin haybarryn adealma morley irma de guzman. So [facility] [phone].    ATENCIÓN: Si habla español, tiene a gustafson disposición servicios gratuitos de asistencia lingüística. Llame al [phone].    We comply with applicable federal civil rights laws and [place] laws. We do not discriminate on the basis of race, color, national origin, age, disability, sex, sexual orientation, or gender identity.            Thank you!     Thank you for choosing [facility]  for your care. Our goal is always to provide you with excellent care. Hearing back from our patients is one way we can continue to improve our services. Please take a few minutes to complete the written survey that you may receive in the mail after your visit with us. Thank you!             Your Updated Medication List - Protect others around you: Learn how to safely use, store and throw away your medicines at www.disposemymeds.org.          This list is accurate as of 10/10/18 11:16 AM.  Always use your most recent med list.                   Brand Name Dispense Instructions for use Diagnosis    ADVAIR HFA IN      Inhale into the lungs daily        anastrozole 1 MG tablet    ARIMIDEX    90 tablet    Take 1 tablet (1 mg) by mouth daily    Personal history of malignant neoplasm of breast       ASPIRIN PO      Take 81 mg by mouth daily        furosemide 20 MG tablet    LASIX    1 tablet    Take 1 tablet (20 mg) by mouth once for 1 dose    HTN (hypertension), Personal history of malignant neoplasm of breast       LOPRESSOR PO      Take 50 mg by mouth daily        METFORMIN HCL PO      Take 500 mg by mouth         simvastatin 40 MG tablet    ZOCOR     Take 20 mg by mouth At Bedtime

## 2018-10-10 NOTE — MR AVS SNAPSHOT
After Visit Summary   10/10/2018    Capri Chacon    MRN: [id number]           Patient Information     Date Of Birth          1945        Visit Information        Provider Department      10/10/2018 8:30 AM Nurse,  Oncology [facility]        Today's Diagnoses     Personal history of malignant neoplasm of breast           Follow-ups after your visit        Your next 10 appointments already scheduled     Oct 10, 2018  8:30 AM CDT   Return Visit with  Oncology Nurse   [facility] ([facility])    [facility]  [address]  [facility] [address]   [phone]            Oct 10, 2018  9:00 AM CDT   Return Visit with Kristan Lim MD   [facility] ([facility])    [facility]  [address]  [facility] [address]   [phone]              Who to contact     If you have questions or need follow up information about today's clinic visit or your schedule please contact [facility] directly at [phone].  Normal or non-critical lab and imaging results will be communicated to you by [employer], letter or phone within 4 business days after the clinic has received the results. If you do not hear from us within 7 days, please contact the clinic through [employer] or phone. If you have a critical or abnormal lab result, we will notify you by phone as soon as possible.  Submit refill requests through [employer] or call your pharmacy and they will forward the refill request to us. Please allow 3 business days for your refill to be completed.          Additional Information About Your Visit        MyChart Information     [employer] gives you secure access to your electronic health record. If you see a primary care provider, you can also send messages to your care team and make appointments. If you have questions, please call your primary care clinic.  If you do not have a  primary care provider, please call [phone] and they will assist you.        Care EveryWhere ID     This is your Care EveryWhere ID. This could be used by other organizations to access your [facility] medical records  [id number]        Your Vitals Were     Last Period                   01/01/2000            Blood Pressure from Last 3 Encounters:   10/11/17 151/90   04/05/17 147/76   09/21/16 141/73    Weight from Last 3 Encounters:   10/11/17 62.1 kg (136 lb 12.8 oz)   04/05/17 62.6 kg (138 lb)   09/21/16 64 kg (141 lb)              We Performed the Following     Ca27.29  breast tumor marker     CBC with platelets differential     Comprehensive metabolic panel        Primary Care Provider Office Phone # Fax #    Aissatou Kay [phone]       [facility] [address] [facility] [address]        Equal Access to Services     YASMEEN REYES : Hadii aad ku hadasho Soomaali, waaxda luqadaha, qaybta kaalmada adeegyada, waxay premin haybarryn roro solis . So [facility] [phone].    ATENCIÓN: Si habla español, tiene a gustafson disposición servicios gratuitos de asistencia lingüística. Russel al [phone].    We comply with applicable federal civil rights laws and [place] laws. We do not discriminate on the basis of race, color, national origin, age, disability, sex, sexual orientation, or gender identity.            Thank you!     Thank you for choosing [facility]  for your care. Our goal is always to provide you with excellent care. Hearing back from our patients is one way we can continue to improve our services. Please take a few minutes to complete the written survey that you may receive in the mail after your visit with us. Thank you!             Your Updated Medication List - Protect others around you: Learn how to safely use, store and throw away your medicines at www.disposemymeds.org.          This list is accurate as of 10/10/18  8:16 AM.  Always use  your most recent med list.                   Brand Name Dispense Instructions for use Diagnosis    ADVAIR HFA IN      Inhale into the lungs daily        anastrozole 1 MG tablet    ARIMIDEX    90 tablet    Take 1 tablet (1 mg) by mouth daily    Personal history of malignant neoplasm of breast       ASPIRIN PO      Take 81 mg by mouth daily        LOPRESSOR PO      Take 50 mg by mouth daily        simvastatin 40 MG tablet    ZOCOR     Take 20 mg by mouth At Bedtime

## 2018-10-10 NOTE — LETTER
10/10/2018         RE: Capri Chacon  [address] [facility] [address]        Dear Colleague,    Thank you for referring your patient, Capri Chacon, to the [facility]. Please see a copy of my visit note below.    Medical Assistant Note:  Capri Chcaon presents today for lab visit.    Patient seen by provider today: Yes: Dr. Lim.   present during visit today: Not Applicable.    Concerns: No Concerns.    Procedure:  Lab draw site: LAC, Needle type: BF, Gauge: 21 g gauze and coban applied.    Post Assessment:  Labs drawn without difficulty: Yes.    Discharge Plan:  Departure Mode: Ambulatory.    Face to Face Time: 4.    Lubna Slaughter MA              Again, thank you for allowing me to participate in the care of your patient.        Sincerely,        Oncology Nurse

## 2018-10-10 NOTE — PATIENT INSTRUCTIONS
BP too high to be discharged. Please recheck. DONE , patient aware to f/u with primary clinic for BP check CY  1 yr f/u with labs. Scheduled/Latonia     AVS printed and given to patient/ Latonia

## 2018-10-10 NOTE — LETTER
"    10/10/2018         RE: Capri Chacon  [address] [facility] [address]        Dear Colleague,    Thank you for referring your patient, Capri Chacon, to the [facility]. Please see a copy of my visit note below.    Oncology Rooming Note    October 10, 2018 9:06 AM   Capri Chacon is a 72 year old female who presents for:    Chief Complaint   Patient presents with     Oncology Clinic Visit     Malignant neoplasm (H)     Initial Vitals: BP (!) 186/97 (BP Location: Left arm, Patient Position: Sitting, Cuff Size: Adult Regular)  Pulse 55  Temp 97.9  F (36.6  C) (Oral)  Resp 16  Wt 63.6 kg (140 lb 3.2 oz)  LMP 01/01/2000  SpO2 94%  BMI 28.32 kg/m2 Estimated body mass index is 28.32 kg/(m^2) as calculated from the following:    Height as of 11/13/14: 1.499 m (4' 11\").    Weight as of this encounter: 63.6 kg (140 lb 3.2 oz). Body surface area is 1.63 meters squared.  No Pain (0) Comment: Data Unavailable   Patient's last menstrual period was 01/01/2000.  Allergies reviewed: Yes  Medications reviewed: Yes    Medications: Medication refills not needed today.  Pharmacy name entered into [employer]: [facility] - [place], [place] - [address] AT PORTAL TO REGISTERED [facility] SITES    Clinical concerns: no   5 minutes for nursing intake (face to face time)          Yaima Sow MA                Oncology follow up visit  CC: right breast cancer T1b triple positive  HPI: She had undergone routine screening mammogram on 03/07/2012 demonstrating a suspicious lesion in the right breast. Biopsy confirmed infiltrating ductal adenocarcinoma. She underwent a right mastectomy on 04/11/2012 demonstrating a 0.9 x 0.7 cm, grade 2 infiltrating ductal adenocarcinoma. Four examined lymph nodes were negative for metastatic disease. There was no lymphovascular invasion or DCIS. Estrogen and progesterone receptors were strongly positive. HER-2/zechariah was positive by FISH " "with a ratio of 6.1.     She went on to receive 6 cycles of TCH chemotherapy from 05/13/2012 through 08/16/2012. She then completed a full year of Herceptin on 04/25/2013.   She was placed on anastrozole on 09/20/2012 because of hormone positive disease.   She made informed decision to proceed with longer anti hormone therapy in 2017.     PMH:  bilateral carpal tunnel syndrome. This is being observed at the present time without surgery planned.   Osteopenia, HTN and hyperlipidemia, asthma, left breast mastectomy in 1996 for \"micalcification\" according to pt    ROS:   she is tolerating Arimidex well without added pain to her preexisting arthritis. She has episodic pain on right frontal ankle. No focal weakness or bleeding issues.     PHYSICAL EXAMINATION:   GENERAL: Capri is well.   VITAL SIGNS: Blood pressure 195/76, pulse 55, temperature 97.9  F (36.6  C), temperature source Oral, resp. rate 16, weight 63.6 kg (140 lb 3.2 oz), last menstrual period 01/01/2000, SpO2 94 %.     ECOG 0    HEENT: The patient is normocephalic. Pupils are equal, round, and react to light and accommodation.   NECK: Supple. No JVD  LYMPH NODES: No anterior cervical, posterior cervical, supraclavicular, axillary or inguinal adenopathy.   LUNGS: Normal breath sounds to auscultation and percussion. No rhonchi or rales.   BACK: No CVA or vertebral tenderness.   HEART: Regular rhythm without murmurs or gallops.   BREASTS: Bilateral mastectomies scars well healed  ABDOMEN: Without hepatosplenomegaly, masses, or tenderness. The patient has normal bowel sounds. No rigidity, guarding or tenderness.   EXTREMITIES: The patient has full range of motion. There is no lymphedema.   NEUROLOGIC: Cranial nerves II-XII are intact. Sensation is intact    CURRENT LAB DATA REVIEWED  Cbc diff/CMP are fine, marker is good.       OLD DATA REVIEWED WITH SUMMARY  3/2017 dexa from outside: nl. 2/2016 CXR nl  2/2015 dexa: borderline osteopenia  2012 dexa: " osteopenia      A/P  1. Right breast cancer in 2012, T1b, triple positive, s/p mastectomy, TC, currently on Arimidex since 2012.  We talked about the side effects of this and how she should be monitored.     She is due 12 months f/u with labs.     I shared with her the long term anti hormone data. She could be a good candidate for long term anti hormone therapy. She is open for this.   She is willing to do that.     We talked about the recurrence pattern associated with ER+ breast cancer and the follow up plan. She is informed on the purpose of blood work along with her visit.     2. HX OF Osteopenia. She finished 6 doses of prolia. She is advised on vit D proper dose. She had another dexa spring 2017 from outside which was nl. Will get the result.     3. HTN uncontrolled- we repeat the measurement, it is still high. Oral Laxix is given. Pt is observed with improvement on her BP after. She is instructed to f/u with her PMD today on this.     Total time is 40 minute, more than 25 minutes spent in counseling regarding her disease status, and managing her high BP reading.               Again, thank you for allowing me to participate in the care of your patient.        Sincerely,        Kristan Lim MD, MD

## 2018-10-10 NOTE — PROGRESS NOTES
"Oncology Rooming Note    October 10, 2018 9:06 AM   Capri Chacon is a 72 year old female who presents for:    Chief Complaint   Patient presents with     Oncology Clinic Visit     Malignant neoplasm (H)     Initial Vitals: BP (!) 186/97 (BP Location: Left arm, Patient Position: Sitting, Cuff Size: Adult Regular)  Pulse 55  Temp 97.9  F (36.6  C) (Oral)  Resp 16  Wt 63.6 kg (140 lb 3.2 oz)  LMP 01/01/2000  SpO2 94%  BMI 28.32 kg/m2 Estimated body mass index is 28.32 kg/(m^2) as calculated from the following:    Height as of 11/13/14: 1.499 m (4' 11\").    Weight as of this encounter: 63.6 kg (140 lb 3.2 oz). Body surface area is 1.63 meters squared.  No Pain (0) Comment: Data Unavailable   Patient's last menstrual period was 01/01/2000.  Allergies reviewed: Yes  Medications reviewed: Yes    Medications: Medication refills not needed today.  Pharmacy name entered into EPIC: [facility] - [place], [place] - [address] AT PORTAL TO REGISTERED [facility] SITES    Clinical concerns: no   5 minutes for nursing intake (face to face time)          Yaima Sow MA              "

## 2018-10-10 NOTE — PROGRESS NOTES
"Oncology follow up visit  CC: right breast cancer T1b triple positive  HPI: She had undergone routine screening mammogram on 03/07/2012 demonstrating a suspicious lesion in the right breast. Biopsy confirmed infiltrating ductal adenocarcinoma. She underwent a right mastectomy on 04/11/2012 demonstrating a 0.9 x 0.7 cm, grade 2 infiltrating ductal adenocarcinoma. Four examined lymph nodes were negative for metastatic disease. There was no lymphovascular invasion or DCIS. Estrogen and progesterone receptors were strongly positive. HER-2/zechariah was positive by FISH with a ratio of 6.1.     She went on to receive 6 cycles of TCH chemotherapy from 05/13/2012 through 08/16/2012. She then completed a full year of Herceptin on 04/25/2013.   She was placed on anastrozole on 09/20/2012 because of hormone positive disease.   She made informed decision to proceed with longer anti hormone therapy in 2017.     PMH:  bilateral carpal tunnel syndrome. This is being observed at the present time without surgery planned.   Osteopenia, HTN and hyperlipidemia, asthma, left breast mastectomy in 1996 for \"micalcification\" according to pt    ROS:   she is tolerating Arimidex well without added pain to her preexisting arthritis. She has episodic pain on right frontal ankle. No focal weakness or bleeding issues.     PHYSICAL EXAMINATION:   GENERAL: Capri is well.   VITAL SIGNS: Blood pressure 195/76, pulse 55, temperature 97.9  F (36.6  C), temperature source Oral, resp. rate 16, weight 63.6 kg (140 lb 3.2 oz), last menstrual period 01/01/2000, SpO2 94 %.     ECOG 0    HEENT: The patient is normocephalic. Pupils are equal, round, and react to light and accommodation.   NECK: Supple. No JVD  LYMPH NODES: No anterior cervical, posterior cervical, supraclavicular, axillary or inguinal adenopathy.   LUNGS: Normal breath sounds to auscultation and percussion. No rhonchi or rales.   BACK: No CVA or vertebral tenderness.   HEART: Regular rhythm " without murmurs or gallops.   BREASTS: Bilateral mastectomies scars well healed  ABDOMEN: Without hepatosplenomegaly, masses, or tenderness. The patient has normal bowel sounds. No rigidity, guarding or tenderness.   EXTREMITIES: The patient has full range of motion. There is no lymphedema.   NEUROLOGIC: Cranial nerves II-XII are intact. Sensation is intact    CURRENT LAB DATA REVIEWED  Cbc diff/CMP are fine, marker is good.       OLD DATA REVIEWED WITH SUMMARY  3/2017 dexa from outside: nl. 2/2016 CXR nl  2/2015 dexa: borderline osteopenia  2012 dexa: osteopenia      A/P  1. Right breast cancer in 2012, T1b, triple positive, s/p mastectomy, TC, currently on Arimidex since 2012.  We talked about the side effects of this and how she should be monitored.     She is due 12 months f/u with labs.     I shared with her the long term anti hormone data. She could be a good candidate for long term anti hormone therapy. She is open for this.   She is willing to do that.     We talked about the recurrence pattern associated with ER+ breast cancer and the follow up plan. She is informed on the purpose of blood work along with her visit.     2. HX OF Osteopenia. She finished 6 doses of prolia. She is advised on vit D proper dose. She had another dexa spring 2017 from outside which was nl. Will get the result.     3. HTN uncontrolled- we repeat the measurement, it is still high. Oral Laxix is given. Pt is observed with improvement on her BP after. She is instructed to f/u with her PMD today on this.     Total time is 40 minute, more than 25 minutes spent in counseling regarding her disease status, and managing her high BP reading.

## 2018-10-10 NOTE — PROGRESS NOTES
Medical Assistant Note:  Capri Chacon presents today for lab visit.    Patient seen by provider today: Yes: Dr. Lim.   present during visit today: Not Applicable.    Concerns: No Concerns.    Procedure:  Lab draw site: LAC, Needle type: BF, Gauge: 21 g gauze and coban applied.    Post Assessment:  Labs drawn without difficulty: Yes.    Discharge Plan:  Departure Mode: Ambulatory.    Face to Face Time: 4.    Lubna Slaughter MA

## 2019-01-15 DIAGNOSIS — Z85.3 PERSONAL HISTORY OF MALIGNANT NEOPLASM OF BREAST: ICD-10-CM

## 2019-01-15 RX ORDER — ANASTROZOLE 1 MG/1
1 TABLET ORAL DAILY
Qty: 90 TABLET | Refills: 3 | Status: SHIPPED | OUTPATIENT
Start: 2019-01-15 | End: 2019-12-26

## 2019-03-08 ENCOUNTER — TELEPHONE (OUTPATIENT)
Dept: ONCOLOGY | Facility: CLINIC | Age: 74
End: 2019-03-08

## 2019-03-08 DIAGNOSIS — Z90.11 ABSENCE OF RIGHT BREAST: Primary | ICD-10-CM

## 2019-03-08 NOTE — TELEPHONE ENCOUNTER
Capri called clinic stating that she needs an order for 12 mastectomy Bras sent to [facility]'s. Order will be placed and faxed to [phone].

## 2019-09-29 ENCOUNTER — HEALTH MAINTENANCE LETTER (OUTPATIENT)
Age: 74
End: 2019-09-29

## 2019-10-09 ENCOUNTER — INFUSION THERAPY VISIT (OUTPATIENT)
Dept: INFUSION THERAPY | Facility: CLINIC | Age: 74
End: 2019-10-09
Attending: INTERNAL MEDICINE
Payer: MEDICARE

## 2019-10-09 ENCOUNTER — HOSPITAL ENCOUNTER (OUTPATIENT)
Facility: CLINIC | Age: 74
Setting detail: SPECIMEN
Discharge: HOME | End: 2019-10-09
Attending: INTERNAL MEDICINE | Admitting: INTERNAL MEDICINE
Payer: MEDICARE

## 2019-10-09 ENCOUNTER — ONCOLOGY VISIT (OUTPATIENT)
Dept: ONCOLOGY | Facility: CLINIC | Age: 74
End: 2019-10-09
Attending: INTERNAL MEDICINE
Payer: MEDICARE

## 2019-10-09 VITALS
WEIGHT: 130.8 LBS | HEART RATE: 66 BPM | SYSTOLIC BLOOD PRESSURE: 176 MMHG | DIASTOLIC BLOOD PRESSURE: 75 MMHG | BODY MASS INDEX: 26.37 KG/M2 | RESPIRATION RATE: 18 BRPM | HEIGHT: 59 IN | TEMPERATURE: 98.4 F | OXYGEN SATURATION: 96 %

## 2019-10-09 DIAGNOSIS — Z85.3 PERSONAL HISTORY OF MALIGNANT NEOPLASM OF BREAST: ICD-10-CM

## 2019-10-09 DIAGNOSIS — Z87.39 HISTORY OF OSTEOPENIA: ICD-10-CM

## 2019-10-09 DIAGNOSIS — C50.911 BILATERAL MALIGNANT NEOPLASM OF BREAST IN FEMALE, ESTROGEN RECEPTOR POSITIVE, UNSPECIFIED SITE OF BREAST (H): ICD-10-CM

## 2019-10-09 DIAGNOSIS — Z17.0 BILATERAL MALIGNANT NEOPLASM OF BREAST IN FEMALE, ESTROGEN RECEPTOR POSITIVE, UNSPECIFIED SITE OF BREAST (H): ICD-10-CM

## 2019-10-09 DIAGNOSIS — Z79.899 ENCOUNTER FOR LONG-TERM (CURRENT) USE OF OTHER MEDICATIONS: ICD-10-CM

## 2019-10-09 DIAGNOSIS — C50.912 BILATERAL MALIGNANT NEOPLASM OF BREAST IN FEMALE, ESTROGEN RECEPTOR POSITIVE, UNSPECIFIED SITE OF BREAST (H): ICD-10-CM

## 2019-10-09 DIAGNOSIS — I15.9 SECONDARY HYPERTENSION: ICD-10-CM

## 2019-10-09 DIAGNOSIS — Z85.3 PERSONAL HISTORY OF MALIGNANT NEOPLASM OF BREAST: Primary | ICD-10-CM

## 2019-10-09 DIAGNOSIS — M85.859 OSTEOPENIA OF HIP, UNSPECIFIED LATERALITY: ICD-10-CM

## 2019-10-09 DIAGNOSIS — Z79.811 AROMATASE INHIBITOR USE: ICD-10-CM

## 2019-10-09 LAB
ALBUMIN SERPL-MCNC: 4.1 G/DL (ref 3.4–5)
ALP SERPL-CCNC: 83 U/L (ref 40–150)
ALT SERPL W P-5'-P-CCNC: 26 U/L (ref 0–50)
ANION GAP SERPL CALCULATED.3IONS-SCNC: 2 MMOL/L (ref 3–14)
AST SERPL W P-5'-P-CCNC: 19 U/L (ref 0–45)
BASOPHILS # BLD AUTO: 0 10E9/L (ref 0–0.2)
BASOPHILS NFR BLD AUTO: 0.2 %
BILIRUB SERPL-MCNC: 0.6 MG/DL (ref 0.2–1.3)
BUN SERPL-MCNC: 28 MG/DL (ref 7–30)
CALCIUM SERPL-MCNC: 9.3 MG/DL (ref 8.5–10.1)
CANCER AG27-29 SERPL-ACNC: 6 U/ML (ref 0–39)
CHLORIDE SERPL-SCNC: 106 MMOL/L (ref 94–109)
CO2 SERPL-SCNC: 32 MMOL/L (ref 20–32)
CREAT SERPL-MCNC: 0.81 MG/DL (ref 0.52–1.04)
DIFFERENTIAL METHOD BLD: NORMAL
EOSINOPHIL # BLD AUTO: 0.3 10E9/L (ref 0–0.7)
EOSINOPHIL NFR BLD AUTO: 3.5 %
ERYTHROCYTE [DISTWIDTH] IN BLOOD BY AUTOMATED COUNT: 13.3 % (ref 10–15)
GFR SERPL CREATININE-BSD FRML MDRD: 72 ML/MIN/{1.73_M2}
GLUCOSE SERPL-MCNC: 81 MG/DL (ref 70–99)
HCT VFR BLD AUTO: 42.4 % (ref 35–47)
HGB BLD-MCNC: 13.8 G/DL (ref 11.7–15.7)
IMM GRANULOCYTES # BLD: 0 10E9/L (ref 0–0.4)
IMM GRANULOCYTES NFR BLD: 0.1 %
LYMPHOCYTES # BLD AUTO: 2.7 10E9/L (ref 0.8–5.3)
LYMPHOCYTES NFR BLD AUTO: 30.3 %
MCH RBC QN AUTO: 30.8 PG (ref 26.5–33)
MCHC RBC AUTO-ENTMCNC: 32.5 G/DL (ref 31.5–36.5)
MCV RBC AUTO: 95 FL (ref 78–100)
MONOCYTES # BLD AUTO: 0.7 10E9/L (ref 0–1.3)
MONOCYTES NFR BLD AUTO: 7.6 %
NEUTROPHILS # BLD AUTO: 5.2 10E9/L (ref 1.6–8.3)
NEUTROPHILS NFR BLD AUTO: 58.3 %
NRBC # BLD AUTO: 0 10*3/UL
NRBC BLD AUTO-RTO: 0 /100
PLATELET # BLD AUTO: 266 10E9/L (ref 150–450)
POTASSIUM SERPL-SCNC: 4.1 MMOL/L (ref 3.4–5.3)
PROT SERPL-MCNC: 7 G/DL (ref 6.8–8.8)
RBC # BLD AUTO: 4.48 10E12/L (ref 3.8–5.2)
SODIUM SERPL-SCNC: 140 MMOL/L (ref 133–144)
WBC # BLD AUTO: 8.9 10E9/L (ref 4–11)

## 2019-10-09 PROCEDURE — 85025 COMPLETE CBC W/AUTO DIFF WBC: CPT | Performed by: INTERNAL MEDICINE

## 2019-10-09 PROCEDURE — 96372 THER/PROPH/DIAG INJ SC/IM: CPT

## 2019-10-09 PROCEDURE — 80053 COMPREHEN METABOLIC PANEL: CPT | Performed by: INTERNAL MEDICINE

## 2019-10-09 PROCEDURE — 36415 COLL VENOUS BLD VENIPUNCTURE: CPT

## 2019-10-09 PROCEDURE — 86300 IMMUNOASSAY TUMOR CA 15-3: CPT | Performed by: INTERNAL MEDICINE

## 2019-10-09 PROCEDURE — 99214 OFFICE O/P EST MOD 30 MIN: CPT | Performed by: INTERNAL MEDICINE

## 2019-10-09 PROCEDURE — 25000128 H RX IP 250 OP 636: Performed by: INTERNAL MEDICINE

## 2019-10-09 PROCEDURE — G0463 HOSPITAL OUTPT CLINIC VISIT: HCPCS | Mod: 25

## 2019-10-09 RX ORDER — DIPHENHYDRAMINE HYDROCHLORIDE 50 MG/ML
50 INJECTION INTRAMUSCULAR; INTRAVENOUS
Status: CANCELLED
Start: 2019-10-09

## 2019-10-09 RX ORDER — ALBUTEROL SULFATE 90 UG/1
1-2 AEROSOL, METERED RESPIRATORY (INHALATION)
Status: CANCELLED
Start: 2019-10-09

## 2019-10-09 RX ORDER — METHYLPREDNISOLONE SODIUM SUCCINATE 125 MG/2ML
125 INJECTION, POWDER, LYOPHILIZED, FOR SOLUTION INTRAMUSCULAR; INTRAVENOUS
Status: CANCELLED
Start: 2019-10-09

## 2019-10-09 RX ORDER — SODIUM CHLORIDE 9 MG/ML
INJECTION, SOLUTION INTRAVENOUS CONTINUOUS PRN
Status: CANCELLED
Start: 2019-10-09

## 2019-10-09 RX ADMIN — DENOSUMAB 60 MG: 60 INJECTION SUBCUTANEOUS at 10:39

## 2019-10-09 ASSESSMENT — MIFFLIN-ST. JEOR: SCORE: 1008.93

## 2019-10-09 ASSESSMENT — PAIN SCALES - GENERAL: PAINLEVEL: NO PAIN (0)

## 2019-10-09 NOTE — LETTER
"    10/9/2019         RE: Capri Chacon  [address] [facility] [address]        Dear Colleague,    Thank you for referring your patient, Capri Chacon, to the [facility]. Please see a copy of my visit note below.    Oncology Rooming Note    October 9, 2019 9:34 AM   Capri Chacon is a 73 year old female who presents for:    Chief Complaint   Patient presents with     Oncology Clinic Visit     Malignant neoplasm (H)     Initial Vitals: BP (!) 181/71 (BP Location: Right arm, Patient Position: Sitting, Cuff Size: Adult Regular)   Pulse 66   Temp 98.4  F (36.9  C) (Oral)   Resp 18   Ht 1.499 m (4' 11\")   Wt 59.3 kg (130 lb 12.8 oz)   LMP 01/01/2000   SpO2 96%   BMI 26.42 kg/m    Estimated body mass index is 26.42 kg/m  as calculated from the following:    Height as of this encounter: 1.499 m (4' 11\").    Weight as of this encounter: 59.3 kg (130 lb 12.8 oz). Body surface area is 1.57 meters squared.  No Pain (0) Comment: Data Unavailable   Patient's last menstrual period was 01/01/2000.  Allergies reviewed: Yes  Medications reviewed: Yes    Medications: MEDICATION REFILLS NEEDED TODAY. Provider was notified. Refill ANASTROZOLE  Pharmacy name entered into [facility]: [facility] - [place], [place] - [address] AT PORTAL TO REGISTERED [facility] SITES    Clinical concerns: no         Yaima Sow CMA      Oncology follow up visit  CC: right breast cancer T1b triple positive  HISTORY OF ONCOLOGY ILLNESS    She had undergone routine screening mammogram on 03/07/2012 demonstrating a suspicious lesion in the right breast. Biopsy confirmed infiltrating ductal adenocarcinoma. She underwent a right mastectomy on 04/11/2012 demonstrating a 0.9 x 0.7 cm, grade 2 infiltrating ductal adenocarcinoma. Four examined lymph nodes were negative for metastatic disease. There was no lymphovascular invasion or DCIS. Estrogen and progesterone receptors were strongly " "positive. HER-2/zechariah was positive by FISH with a ratio of 6.1.     She went on to receive 6 cycles of TCH chemotherapy from 05/13/2012 through 08/16/2012. She then completed a full year of Herceptin on 04/25/2013.   She was placed on anastrozole on 09/20/2012 because of hormone positive disease.   She made informed decision to proceed with longer anti hormone therapy in 2017.       INTERVAL HISTORY:  She had cataract surgery on the left.   She continues to tolerate AI fine. Dexa got worse in 3/2019.     PMH:  bilateral carpal tunnel syndrome. This is being observed at the present time without surgery planned.   Osteopenia, HTN and hyperlipidemia, asthma, left breast mastectomy in 1996 for \"micalcification\" according to pt      ROS:   she is tolerating Arimidex well without added pain to her preexisting arthritis.   She has episodic pain on right frontal ankle.   No focal weakness or bleeding issues.   She last her .       PHYSICAL EXAMINATION:   GENERAL: Capri is well.   VITAL SIGNS: Blood pressure (!) 176/75, pulse 66, temperature 98.4  F (36.9  C), temperature source Oral, resp. rate 18, height 1.499 m (4' 11\"), weight 59.3 kg (130 lb 12.8 oz), last menstrual period 01/01/2000, SpO2 96 %.     ECOG 0    HEENT: The patient is normocephalic. Pupils are equal, round, and react to light and accommodation.   NECK: Supple. No JVD  LYMPH NODES: No anterior cervical, posterior cervical, supraclavicular, axillary or inguinal adenopathy.   LUNGS: Normal breath sounds to auscultation and percussion. No rhonchi or rales.   BACK: No CVA or vertebral tenderness.   HEART: Regular rhythm without murmurs or gallops.   BREASTS: Bilateral mastectomies scars well healed  ABDOMEN: Without hepatosplenomegaly, masses, or tenderness. The patient has normal bowel sounds. No rigidity, guarding or tenderness.   EXTREMITIES: The patient has full range of motion. There is no lymphedema.   NEUROLOGIC: Cranial nerves II-XII are intact. " Sensation is intact      CURRENT LAB DATA REVIEWED  Cbc diff/CMP are fine, marker is pending      Current image data reviewed  dexa 3/2019 - osteopenia is worse.     OLD DATA REVIEWED WITH SUMMARY  3/2017 dexa from outside: nl. 2/2016 CXR nl  2/2015 dexa: borderline osteopenia  2012 dexa: osteopenia      A/P  1. Right breast cancer in 2012, T1b, triple positive, s/p mastectomy, TCH, currently on Arimidex since 2012.  We talked about the side effects of this and how she should be monitored.     She is due 12 months f/u with labs.     She made informed decision to do long term anti hormone therapy.   She is willing to do that.     We talked about the recurrence pattern associated with ER+ breast cancer and the follow up plan. She is informed on the purpose of blood work along with her visit.     2. Osteopenia is worse on dexa 3/2019.    She finished 6 doses of prolia.   She is advised on vit D proper dose.   Advice her to retry prolia.     3. HTN. She said it is only high at office.   Her home reading 120-130 dorene.     Advice on going home measuring of her BP.                   Again, thank you for allowing me to participate in the care of your patient.        Sincerely,        Kristan Lim MD, MD

## 2019-10-09 NOTE — PROGRESS NOTES
"Oncology follow up visit  CC: right breast cancer T1b triple positive  HISTORY OF ONCOLOGY ILLNESS    She had undergone routine screening mammogram on 03/07/2012 demonstrating a suspicious lesion in the right breast. Biopsy confirmed infiltrating ductal adenocarcinoma. She underwent a right mastectomy on 04/11/2012 demonstrating a 0.9 x 0.7 cm, grade 2 infiltrating ductal adenocarcinoma. Four examined lymph nodes were negative for metastatic disease. There was no lymphovascular invasion or DCIS. Estrogen and progesterone receptors were strongly positive. HER-2/zechariah was positive by FISH with a ratio of 6.1.     She went on to receive 6 cycles of TCH chemotherapy from 05/13/2012 through 08/16/2012. She then completed a full year of Herceptin on 04/25/2013.   She was placed on anastrozole on 09/20/2012 because of hormone positive disease.   She made informed decision to proceed with longer anti hormone therapy in 2017.       INTERVAL HISTORY:  She had cataract surgery on the left.   She continues to tolerate AI fine. Dexa got worse in 3/2019.     PMH:  bilateral carpal tunnel syndrome. This is being observed at the present time without surgery planned.   Osteopenia, HTN and hyperlipidemia, asthma, left breast mastectomy in 1996 for \"micalcification\" according to pt      ROS:   she is tolerating Arimidex well without added pain to her preexisting arthritis.   She has episodic pain on right frontal ankle.   No focal weakness or bleeding issues.   She last her .       PHYSICAL EXAMINATION:   GENERAL: Capri is well.   VITAL SIGNS: Blood pressure (!) 176/75, pulse 66, temperature 98.4  F (36.9  C), temperature source Oral, resp. rate 18, height 1.499 m (4' 11\"), weight 59.3 kg (130 lb 12.8 oz), last menstrual period 01/01/2000, SpO2 96 %.     ECOG 0    HEENT: The patient is normocephalic. Pupils are equal, round, and react to light and accommodation.   NECK: Supple. No JVD  LYMPH NODES: No anterior cervical, " posterior cervical, supraclavicular, axillary or inguinal adenopathy.   LUNGS: Normal breath sounds to auscultation and percussion. No rhonchi or rales.   BACK: No CVA or vertebral tenderness.   HEART: Regular rhythm without murmurs or gallops.   BREASTS: Bilateral mastectomies scars well healed  ABDOMEN: Without hepatosplenomegaly, masses, or tenderness. The patient has normal bowel sounds. No rigidity, guarding or tenderness.   EXTREMITIES: The patient has full range of motion. There is no lymphedema.   NEUROLOGIC: Cranial nerves II-XII are intact. Sensation is intact      CURRENT LAB DATA REVIEWED  Cbc diff/CMP are fine, marker is pending      Current image data reviewed  dexa 3/2019 - osteopenia is worse.     OLD DATA REVIEWED WITH SUMMARY  3/2017 dexa from outside: nl. 2/2016 CXR nl  2/2015 dexa: borderline osteopenia  2012 dexa: osteopenia      A/P  1. Right breast cancer in 2012, T1b, triple positive, s/p mastectomy, TC, currently on Arimidex since 2012.  We talked about the side effects of this and how she should be monitored.     She is due 12 months f/u with labs.     She made informed decision to do long term anti hormone therapy.   She is willing to do that.     We talked about the recurrence pattern associated with ER+ breast cancer and the follow up plan. She is informed on the purpose of blood work along with her visit.     2. Osteopenia is worse on dexa 3/2019.    She finished 6 doses of prolia.   She is advised on vit D proper dose.   Advice her to retry prolia.     3. HTN. She said it is only high at office.   Her home reading 120-130 dorene.     Advice on going home measuring of her BP.

## 2019-10-09 NOTE — PROGRESS NOTES
Medical Assistant Note:  Capri Chacon presents today for Lab Draw.    Patient seen by provider today: Yes: Ge.   present during visit today: Not Applicable.    Concerns: No Concerns.    Procedure:  Lab draw site: RAC, Needle type: Butterfly, Gauge: 23.    Post Assessment:  Labs drawn without difficulty: Yes.    Discharge Plan:  Departure Mode: Ambulatory.    Face to Face Time: 4 min.    Shari Schoenberger, CMA

## 2019-12-26 DIAGNOSIS — Z85.3 PERSONAL HISTORY OF MALIGNANT NEOPLASM OF BREAST: ICD-10-CM

## 2019-12-26 RX ORDER — ANASTROZOLE 1 MG/1
1 TABLET ORAL DAILY
Qty: 90 TABLET | Refills: 3 | Status: SHIPPED | OUTPATIENT
Start: 2019-12-26 | End: 2020-10-21

## 2020-03-15 ENCOUNTER — HEALTH MAINTENANCE LETTER (OUTPATIENT)
Age: 75
End: 2020-03-15

## 2020-04-08 ENCOUNTER — TELEPHONE (OUTPATIENT)
Dept: ONCOLOGY | Facility: CLINIC | Age: 75
End: 2020-04-08

## 2020-04-08 NOTE — TELEPHONE ENCOUNTER
"Patient is currently scheduled for an appointment at [facility] in [place].  Called patient to review current visitor restrictions and complete COVID-19 Patient Infection/Travel Screening Tool.     Due to the recent public health concerns around COVID-19 and in an effort to keep our patients and staff safe and healthy, we are implementing a screening process for the patients that come to our clinic.      I am going to ask you a few questions, please answer yes or no.  Your honesty about any symptoms is critical, as it keeps patients and staff healthy.      Do you have a:  Fever (or reported chills)?  No  Cough?  No  Shortness of breath?  No  Rash?  No    In the last month, have you been in contact with someone who was confirmed or suspected to have Coronavirus/COVID-19?  No    Have you traveled internationally in the last month?  No  If so, where?  N/A     I also wanted to let you know that to protect our patients from the flu and other common illnesses, [facility] enforce visitor restrictions year round, but due to the community spread of COVID-19 in [place], we are taking additional precautionary steps to ensure the health of our patients.  At this time, NO visitors are allowed on our hospital and clinic campuses.     Patient PASSED the screening assessment.    Patient instructed to come to the clinic as planned for their scheduled appointment and to call the clinic if any symptoms develop prior to their appointment.    \"COVID-19 is contagious and can be dangerous for our patients and staff.  Please send us a MyChart message or call our clinic before coming in if you feel any of the following symptoms: fever, cough, congestion, runny nose, sore throat, muscle aches and pains, or shortness of breath.  If you are already at our clinic, it is very important that you be honest about any symptoms you are experiencing to ensure your safety and that of other patients and staff who " "treat you.  If you do have symptoms, we will have a nurse and/or provider asses you to determine next steps.\"    Jessy Chappell, CMA on 4/8/2020 at 1:37 PM    "

## 2020-04-09 ENCOUNTER — INFUSION THERAPY VISIT (OUTPATIENT)
Dept: INFUSION THERAPY | Facility: CLINIC | Age: 75
End: 2020-04-09
Attending: INTERNAL MEDICINE
Payer: MEDICARE

## 2020-04-09 ENCOUNTER — HOSPITAL ENCOUNTER (OUTPATIENT)
Facility: CLINIC | Age: 75
Setting detail: SPECIMEN
Discharge: HOME | End: 2020-04-09
Attending: INTERNAL MEDICINE | Admitting: INTERNAL MEDICINE
Payer: MEDICARE

## 2020-04-09 DIAGNOSIS — C50.912 BILATERAL MALIGNANT NEOPLASM OF BREAST IN FEMALE, ESTROGEN RECEPTOR POSITIVE, UNSPECIFIED SITE OF BREAST (H): Primary | ICD-10-CM

## 2020-04-09 DIAGNOSIS — Z79.899 ENCOUNTER FOR LONG-TERM (CURRENT) USE OF OTHER MEDICATIONS: ICD-10-CM

## 2020-04-09 DIAGNOSIS — C50.911 BILATERAL MALIGNANT NEOPLASM OF BREAST IN FEMALE, ESTROGEN RECEPTOR POSITIVE, UNSPECIFIED SITE OF BREAST (H): Primary | ICD-10-CM

## 2020-04-09 DIAGNOSIS — Z17.0 BILATERAL MALIGNANT NEOPLASM OF BREAST IN FEMALE, ESTROGEN RECEPTOR POSITIVE, UNSPECIFIED SITE OF BREAST (H): Primary | ICD-10-CM

## 2020-04-09 DIAGNOSIS — M85.859 OSTEOPENIA OF HIP, UNSPECIFIED LATERALITY: ICD-10-CM

## 2020-04-09 LAB
BASOPHILS # BLD AUTO: 0 10E9/L (ref 0–0.2)
BASOPHILS NFR BLD AUTO: 0.2 %
CALCIUM SERPL-MCNC: 9.9 MG/DL (ref 8.5–10.1)
CANCER AG27-29 SERPL-ACNC: <5 U/ML (ref 0–39)
CREAT SERPL-MCNC: 0.8 MG/DL (ref 0.52–1.04)
DIFFERENTIAL METHOD BLD: NORMAL
EOSINOPHIL # BLD AUTO: 0.5 10E9/L (ref 0–0.7)
EOSINOPHIL NFR BLD AUTO: 5.8 %
ERYTHROCYTE [DISTWIDTH] IN BLOOD BY AUTOMATED COUNT: 13 % (ref 10–15)
GFR SERPL CREATININE-BSD FRML MDRD: 73 ML/MIN/{1.73_M2}
HCT VFR BLD AUTO: 38.9 % (ref 35–47)
HGB BLD-MCNC: 12.8 G/DL (ref 11.7–15.7)
IMM GRANULOCYTES # BLD: 0 10E9/L (ref 0–0.4)
IMM GRANULOCYTES NFR BLD: 0.1 %
LYMPHOCYTES # BLD AUTO: 2.5 10E9/L (ref 0.8–5.3)
LYMPHOCYTES NFR BLD AUTO: 30.1 %
MCH RBC QN AUTO: 30.8 PG (ref 26.5–33)
MCHC RBC AUTO-ENTMCNC: 32.9 G/DL (ref 31.5–36.5)
MCV RBC AUTO: 94 FL (ref 78–100)
MONOCYTES # BLD AUTO: 1 10E9/L (ref 0–1.3)
MONOCYTES NFR BLD AUTO: 11.9 %
NEUTROPHILS # BLD AUTO: 4.3 10E9/L (ref 1.6–8.3)
NEUTROPHILS NFR BLD AUTO: 51.9 %
NRBC # BLD AUTO: 0 10*3/UL
NRBC BLD AUTO-RTO: 0 /100
PLATELET # BLD AUTO: 250 10E9/L (ref 150–450)
RBC # BLD AUTO: 4.15 10E12/L (ref 3.8–5.2)
WBC # BLD AUTO: 8.3 10E9/L (ref 4–11)

## 2020-04-09 PROCEDURE — 82565 ASSAY OF CREATININE: CPT | Performed by: INTERNAL MEDICINE

## 2020-04-09 PROCEDURE — 36415 COLL VENOUS BLD VENIPUNCTURE: CPT

## 2020-04-09 PROCEDURE — 96372 THER/PROPH/DIAG INJ SC/IM: CPT

## 2020-04-09 PROCEDURE — 86300 IMMUNOASSAY TUMOR CA 15-3: CPT | Performed by: INTERNAL MEDICINE

## 2020-04-09 PROCEDURE — 85025 COMPLETE CBC W/AUTO DIFF WBC: CPT | Performed by: INTERNAL MEDICINE

## 2020-04-09 PROCEDURE — 82310 ASSAY OF CALCIUM: CPT | Performed by: INTERNAL MEDICINE

## 2020-04-09 NOTE — PROGRESS NOTES
Medical Assistant Note:  Capri Chacon presents today for blood draw.    Patient seen by provider today: No.   present during visit today: Not Applicable.    Concerns: No Concerns.    Procedure:  Lab draw site: rac, Needle type: bf, Gauge: 23.    Post Assessment:  Labs drawn without difficulty: Yes.    Discharge Plan:  Departure Mode: Ambulatory.    Face to Face Time: 5min    Matthew labs for a Prolia shot 4/10/2020.    Jessy Chappell, CMA

## 2020-04-09 NOTE — PROGRESS NOTES
"Patient is currently scheduled for an appointment at [facility] in [place].  Called patient to review current visitor restrictions and complete COVID-19 Patient Infection/Travel Screening Tool.     Due to the recent public health concerns around COVID-19 and in an effort to keep our patients and staff safe and healthy, we are implementing a screening process for the patients that come to our clinic.      I am going to ask you a few questions, please answer yes or no.  Your honesty about any symptoms is critical, as it keeps patients and staff healthy.      Do you have a:  Fever (or reported chills)?  No  Cough?  No  Shortness of breath?  No  Rash?  No    In the last month, have you been in contact with someone who was confirmed or suspected to have Coronavirus/COVID-19?  No    Have you traveled internationally in the last month?  No  If so, where?  N/A     I also wanted to let you know that to protect our patients from the flu and other common illnesses, [facility] enforce visitor restrictions year round, but due to the community spread of COVID-19 in [place], we are taking additional precautionary steps to ensure the health of our patients.  At this time, NO visitors are allowed on our hospital and clinic campuses.     Patient PASSED the screening assessment.    Patient instructed to come to the clinic as planned for their scheduled appointment and to call the clinic if any symptoms develop prior to their appointment.    \"COVID-19 is contagious and can be dangerous for our patients and staff.  Please send us a MyChart message or call our clinic before coming in if you feel any of the following symptoms: fever, cough, congestion, runny nose, sore throat, muscle aches and pains, or shortness of breath.  If you are already at our clinic, it is very important that you be honest about any symptoms you are experiencing to ensure your safety and that of other patients and staff who " "treat you.  If you do have symptoms, we will have a nurse and/or provider asses you to determine next steps.\"    Jessy Chappell, CMA on 4/9/2020 at 8:41 AM    "

## 2020-04-10 ENCOUNTER — ALLIED HEALTH/NURSE VISIT (OUTPATIENT)
Dept: INFUSION THERAPY | Facility: CLINIC | Age: 75
End: 2020-04-10
Attending: INTERNAL MEDICINE
Payer: MEDICARE

## 2020-04-10 VITALS
RESPIRATION RATE: 16 BRPM | DIASTOLIC BLOOD PRESSURE: 73 MMHG | TEMPERATURE: 98.5 F | HEART RATE: 74 BPM | SYSTOLIC BLOOD PRESSURE: 156 MMHG

## 2020-04-10 DIAGNOSIS — C50.912 BILATERAL MALIGNANT NEOPLASM OF BREAST IN FEMALE, ESTROGEN RECEPTOR POSITIVE, UNSPECIFIED SITE OF BREAST (H): ICD-10-CM

## 2020-04-10 DIAGNOSIS — Z79.899 ENCOUNTER FOR LONG-TERM (CURRENT) USE OF OTHER MEDICATIONS: ICD-10-CM

## 2020-04-10 DIAGNOSIS — M85.859 OSTEOPENIA OF HIP, UNSPECIFIED LATERALITY: Primary | ICD-10-CM

## 2020-04-10 DIAGNOSIS — C50.911 BILATERAL MALIGNANT NEOPLASM OF BREAST IN FEMALE, ESTROGEN RECEPTOR POSITIVE, UNSPECIFIED SITE OF BREAST (H): ICD-10-CM

## 2020-04-10 DIAGNOSIS — Z17.0 BILATERAL MALIGNANT NEOPLASM OF BREAST IN FEMALE, ESTROGEN RECEPTOR POSITIVE, UNSPECIFIED SITE OF BREAST (H): ICD-10-CM

## 2020-04-10 PROCEDURE — 25000128 H RX IP 250 OP 636: Performed by: NURSE PRACTITIONER

## 2020-04-10 RX ORDER — SODIUM CHLORIDE 9 MG/ML
INJECTION, SOLUTION INTRAVENOUS CONTINUOUS PRN
Status: CANCELLED
Start: 2020-04-10

## 2020-04-10 RX ORDER — METHYLPREDNISOLONE SODIUM SUCCINATE 125 MG/2ML
125 INJECTION, POWDER, LYOPHILIZED, FOR SOLUTION INTRAMUSCULAR; INTRAVENOUS
Status: CANCELLED
Start: 2020-04-10

## 2020-04-10 RX ORDER — ALBUTEROL SULFATE 90 UG/1
1-2 AEROSOL, METERED RESPIRATORY (INHALATION)
Status: CANCELLED
Start: 2020-04-10

## 2020-04-10 RX ORDER — DIPHENHYDRAMINE HYDROCHLORIDE 50 MG/ML
50 INJECTION INTRAMUSCULAR; INTRAVENOUS
Status: CANCELLED
Start: 2020-04-10

## 2020-04-10 RX ADMIN — DENOSUMAB 60 MG: 60 INJECTION SUBCUTANEOUS at 13:03

## 2020-04-10 ASSESSMENT — PAIN SCALES - GENERAL: PAINLEVEL: NO PAIN (0)

## 2020-04-10 NOTE — PROGRESS NOTES
Nursing Note:  Capri Chacon presents today for prolia.    Patient seen by provider today: No   present during visit today: Not Applicable.    Note: instructed patient to take calcium with vitamin D as prescribed.    Intravenous Access:  No Intravenous access/labs at this visit.    Discharge Plan:   Patient was sent to [facility] for discharge to home.    Vilma Hawthorne RN

## 2020-04-29 NOTE — RESULT ENCOUNTER NOTE
Dear Ms. Chacon,    Blood tests are all normal.    Please, call me with any questions.    Ted Nassar MD

## 2020-06-08 ENCOUNTER — TELEPHONE (OUTPATIENT)
Dept: ONCOLOGY | Facility: CLINIC | Age: 75
End: 2020-06-08

## 2020-06-08 DIAGNOSIS — C50.911 BILATERAL MALIGNANT NEOPLASM OF BREAST IN FEMALE, ESTROGEN RECEPTOR POSITIVE, UNSPECIFIED SITE OF BREAST (H): Primary | ICD-10-CM

## 2020-06-08 DIAGNOSIS — C50.912 BILATERAL MALIGNANT NEOPLASM OF BREAST IN FEMALE, ESTROGEN RECEPTOR POSITIVE, UNSPECIFIED SITE OF BREAST (H): Primary | ICD-10-CM

## 2020-06-08 DIAGNOSIS — Z17.0 BILATERAL MALIGNANT NEOPLASM OF BREAST IN FEMALE, ESTROGEN RECEPTOR POSITIVE, UNSPECIFIED SITE OF BREAST (H): Primary | ICD-10-CM

## 2020-06-08 NOTE — TELEPHONE ENCOUNTER
Patient called to request that new order for breast prosthesis be placed and faxed to [facility] in [place].  She states she had an order for mastectomy bra but not the prosthesis.  Called [facility] to clarify what is needed for the order and was told that the quantity is missing.    New order placed and faxed to [facility] in [place] (fax [phone]).    NABIL LovingN, RN, OCN  Oncology Care Coordinator  [facility]

## 2020-09-10 ENCOUNTER — TELEPHONE (OUTPATIENT)
Dept: ONCOLOGY | Facility: CLINIC | Age: 75
End: 2020-09-10

## 2020-09-10 NOTE — TELEPHONE ENCOUNTER
Capri called to inquire if her Prolia injection, that is due in October, will be covered by insurance.    Routing to financial liason to verify.    Darius Novoa, NABILN, RN, OCN  Oncology Care Coordinator  [facility]

## 2020-09-15 NOTE — TELEPHONE ENCOUNTER
Writer received another call from patient as she has not hear back from pharmacy liaison if the Prolia is covered by insurance.    Writer will forward message to pharmacy team.    Di Gomez RN

## 2020-10-13 ENCOUNTER — INFUSION THERAPY VISIT (OUTPATIENT)
Dept: INFUSION THERAPY | Facility: CLINIC | Age: 75
End: 2020-10-13
Attending: NURSE PRACTITIONER
Payer: MEDICARE

## 2020-10-13 ENCOUNTER — HOSPITAL ENCOUNTER (OUTPATIENT)
Facility: CLINIC | Age: 75
Setting detail: SPECIMEN
Discharge: HOME | End: 2020-10-13
Attending: NURSE PRACTITIONER | Admitting: INTERNAL MEDICINE
Payer: MEDICARE

## 2020-10-13 DIAGNOSIS — Z85.3 PERSONAL HISTORY OF MALIGNANT NEOPLASM OF BREAST: ICD-10-CM

## 2020-10-13 LAB
ALBUMIN SERPL-MCNC: 4 G/DL (ref 3.4–5)
ALP SERPL-CCNC: 56 U/L (ref 40–150)
ALT SERPL W P-5'-P-CCNC: 22 U/L (ref 0–50)
ANION GAP SERPL CALCULATED.3IONS-SCNC: 4 MMOL/L (ref 3–14)
AST SERPL W P-5'-P-CCNC: 16 U/L (ref 0–45)
BASOPHILS # BLD AUTO: 0 10E9/L (ref 0–0.2)
BASOPHILS NFR BLD AUTO: 0.4 %
BILIRUB SERPL-MCNC: 0.4 MG/DL (ref 0.2–1.3)
BUN SERPL-MCNC: 17 MG/DL (ref 7–30)
CALCIUM SERPL-MCNC: 9 MG/DL (ref 8.5–10.1)
CANCER AG27-29 SERPL-ACNC: 8 U/ML (ref 0–39)
CHLORIDE SERPL-SCNC: 108 MMOL/L (ref 94–109)
CO2 SERPL-SCNC: 29 MMOL/L (ref 20–32)
CREAT SERPL-MCNC: 0.85 MG/DL (ref 0.52–1.04)
DIFFERENTIAL METHOD BLD: NORMAL
EOSINOPHIL # BLD AUTO: 0.5 10E9/L (ref 0–0.7)
EOSINOPHIL NFR BLD AUTO: 5.3 %
ERYTHROCYTE [DISTWIDTH] IN BLOOD BY AUTOMATED COUNT: 13.8 % (ref 10–15)
GFR SERPL CREATININE-BSD FRML MDRD: 67 ML/MIN/{1.73_M2}
GLUCOSE SERPL-MCNC: 102 MG/DL (ref 70–99)
HCT VFR BLD AUTO: 36.8 % (ref 35–47)
HGB BLD-MCNC: 12.3 G/DL (ref 11.7–15.7)
IMM GRANULOCYTES # BLD: 0 10E9/L (ref 0–0.4)
IMM GRANULOCYTES NFR BLD: 0.1 %
LYMPHOCYTES # BLD AUTO: 2.8 10E9/L (ref 0.8–5.3)
LYMPHOCYTES NFR BLD AUTO: 27.7 %
MCH RBC QN AUTO: 31.9 PG (ref 26.5–33)
MCHC RBC AUTO-ENTMCNC: 33.4 G/DL (ref 31.5–36.5)
MCV RBC AUTO: 96 FL (ref 78–100)
MONOCYTES # BLD AUTO: 0.9 10E9/L (ref 0–1.3)
MONOCYTES NFR BLD AUTO: 9.3 %
NEUTROPHILS # BLD AUTO: 5.7 10E9/L (ref 1.6–8.3)
NEUTROPHILS NFR BLD AUTO: 57.2 %
NRBC # BLD AUTO: 0 10*3/UL
NRBC BLD AUTO-RTO: 0 /100
PLATELET # BLD AUTO: 242 10E9/L (ref 150–450)
POTASSIUM SERPL-SCNC: 4.1 MMOL/L (ref 3.4–5.3)
PROT SERPL-MCNC: 6.7 G/DL (ref 6.8–8.8)
RBC # BLD AUTO: 3.85 10E12/L (ref 3.8–5.2)
SODIUM SERPL-SCNC: 141 MMOL/L (ref 133–144)
WBC # BLD AUTO: 9.9 10E9/L (ref 4–11)

## 2020-10-13 PROCEDURE — 85025 COMPLETE CBC W/AUTO DIFF WBC: CPT | Performed by: INTERNAL MEDICINE

## 2020-10-13 PROCEDURE — 86300 IMMUNOASSAY TUMOR CA 15-3: CPT | Performed by: INTERNAL MEDICINE

## 2020-10-13 PROCEDURE — 36415 COLL VENOUS BLD VENIPUNCTURE: CPT

## 2020-10-13 PROCEDURE — 80053 COMPREHEN METABOLIC PANEL: CPT | Performed by: INTERNAL MEDICINE

## 2020-10-13 PROCEDURE — 99207 PR NO CHARGE LOS: CPT

## 2020-10-13 NOTE — PROGRESS NOTES
Medical Assistant Note:  Capri Chacon presents today for blood draw.    Patient seen by provider today: No.   present during visit today: Not Applicable.    Concerns: No Concerns.    Procedure:  Lab draw site: lac, Needle type: bf, Gauge: 23.    Post Assessment:  Labs drawn without difficulty: Yes.    Discharge Plan:  Departure Mode: Ambulatory.    Face to Face Time: 5 min  .    Jessy Chappell, CMA

## 2020-10-21 ENCOUNTER — ONCOLOGY VISIT (OUTPATIENT)
Dept: ONCOLOGY | Facility: CLINIC | Age: 75
End: 2020-10-21
Attending: INTERNAL MEDICINE
Payer: MEDICARE

## 2020-10-21 VITALS
TEMPERATURE: 97.8 F | WEIGHT: 121.6 LBS | BODY MASS INDEX: 24.56 KG/M2 | HEART RATE: 63 BPM | RESPIRATION RATE: 16 BRPM | SYSTOLIC BLOOD PRESSURE: 126 MMHG | DIASTOLIC BLOOD PRESSURE: 76 MMHG | OXYGEN SATURATION: 98 %

## 2020-10-21 DIAGNOSIS — C50.911 BILATERAL MALIGNANT NEOPLASM OF BREAST IN FEMALE, ESTROGEN RECEPTOR POSITIVE, UNSPECIFIED SITE OF BREAST (H): ICD-10-CM

## 2020-10-21 DIAGNOSIS — M85.859 OSTEOPENIA OF HIP, UNSPECIFIED LATERALITY: ICD-10-CM

## 2020-10-21 DIAGNOSIS — Z85.3 PERSONAL HISTORY OF MALIGNANT NEOPLASM OF BREAST: Primary | ICD-10-CM

## 2020-10-21 DIAGNOSIS — Z17.0 BILATERAL MALIGNANT NEOPLASM OF BREAST IN FEMALE, ESTROGEN RECEPTOR POSITIVE, UNSPECIFIED SITE OF BREAST (H): ICD-10-CM

## 2020-10-21 DIAGNOSIS — C50.912 BILATERAL MALIGNANT NEOPLASM OF BREAST IN FEMALE, ESTROGEN RECEPTOR POSITIVE, UNSPECIFIED SITE OF BREAST (H): ICD-10-CM

## 2020-10-21 PROCEDURE — G0463 HOSPITAL OUTPT CLINIC VISIT: HCPCS | Mod: 25

## 2020-10-21 PROCEDURE — 96372 THER/PROPH/DIAG INJ SC/IM: CPT | Performed by: INTERNAL MEDICINE

## 2020-10-21 PROCEDURE — 99214 OFFICE O/P EST MOD 30 MIN: CPT | Performed by: INTERNAL MEDICINE

## 2020-10-21 PROCEDURE — 250N000011 HC RX IP 250 OP 636: Performed by: INTERNAL MEDICINE

## 2020-10-21 RX ORDER — DIPHENHYDRAMINE HYDROCHLORIDE 50 MG/ML
50 INJECTION INTRAMUSCULAR; INTRAVENOUS
Status: CANCELLED
Start: 2020-10-21

## 2020-10-21 RX ORDER — SODIUM CHLORIDE 9 MG/ML
INJECTION, SOLUTION INTRAVENOUS CONTINUOUS PRN
Status: CANCELLED
Start: 2020-10-21

## 2020-10-21 RX ORDER — ALBUTEROL SULFATE 90 UG/1
1-2 AEROSOL, METERED RESPIRATORY (INHALATION)
Status: CANCELLED
Start: 2020-10-21

## 2020-10-21 RX ORDER — METHYLPREDNISOLONE SODIUM SUCCINATE 125 MG/2ML
125 INJECTION, POWDER, LYOPHILIZED, FOR SOLUTION INTRAMUSCULAR; INTRAVENOUS
Status: CANCELLED
Start: 2020-10-21

## 2020-10-21 RX ADMIN — DENOSUMAB 60 MG: 60 INJECTION SUBCUTANEOUS at 11:28

## 2020-10-21 ASSESSMENT — PAIN SCALES - GENERAL: PAINLEVEL: NO PAIN (0)

## 2020-10-21 NOTE — LETTER
"    10/21/2020         RE: Capri Chacon  [address]  [place] [address]        Dear Colleague,    Thank you for referring your patient, Capri Chacon, to the [facility]. Please see a copy of my visit note below.    Oncology Rooming Note    October 21, 2020 10:44 AM   Capri Chacon is a 74 year old female who presents for:    Chief Complaint   Patient presents with     Oncology Clinic Visit     Malignant neoplasm (H)     Initial Vitals: BP (!) 162/77 (BP Location: Left arm, Patient Position: Sitting, Cuff Size: Adult Regular)   Pulse 63   Temp 97.8  F (36.6  C) (Oral)   Resp 16   Wt 55.2 kg (121 lb 9.6 oz)   LMP 01/01/2000   SpO2 98%   BMI 24.56 kg/m   Estimated body mass index is 24.56 kg/m  as calculated from the following:    Height as of 10/9/19: 1.499 m (4' 11\").    Weight as of this encounter: 55.2 kg (121 lb 9.6 oz). Body surface area is 1.52 meters squared.  No Pain (0) Comment: Data Unavailable   Patient's last menstrual period was 01/01/2000.  Allergies reviewed: Yes  Medications reviewed: Yes    Medications: Medication refills not needed today.  Pharmacy name entered into [employer]: [facility] - [place], [place] - [address] AT PORTAL TO REGISTERED [facility] SITES    Clinical concerns: no      Yaima Sow CMA                Oncology follow up visit  CC: right breast cancer T1b triple positive  HISTORY OF ONCOLOGY ILLNESS    She had undergone routine screening mammogram on 03/07/2012 demonstrating a suspicious lesion in the right breast. Biopsy confirmed infiltrating ductal adenocarcinoma.      She underwent a right mastectomy on 04/11/2012 demonstrating a 0.9 x 0.7 cm, grade 2 infiltrating ductal adenocarcinoma. Four examined lymph nodes were negative for metastatic disease. There was no lymphovascular invasion or DCIS. Estrogen and progesterone receptors were strongly positive. HER-2/zechariah was positive by FISH with a " "ratio of 6.1.     She went on to receive 6 cycles of TCH chemotherapy from 05/13/2012 through 08/16/2012. She then completed a full year of Herceptin on 04/25/2013.   She was placed on anastrozole 09/2012.      INTERVAL HISTORY:  She continues to tolerate AI fine. Dexa got worse in 3/2019. Prolia was resumed.        PMH:  bilateral carpal tunnel syndrome. This is being observed at the present time without surgery planned.   Osteopenia, HTN and hyperlipidemia, asthma, left breast mastectomy in 1996 for \"micalcification\" according to pt      ROS:   she is tolerating Arimidex well without added pain to her preexisting arthritis.   She has episodic pain on right frontal ankle.   No focal weakness or bleeding issues.   She last her .       PHYSICAL EXAMINATION:   GENERAL: Capri is well.   VITAL SIGNS: Blood pressure 126/76, pulse 63, temperature 97.8  F (36.6  C), temperature source Oral, resp. rate 16, weight 55.2 kg (121 lb 9.6 oz), last menstrual period 01/01/2000, SpO2 98 %.     ECOG 0    HEENT: The patient is normocephalic. Pupils are equal, round, and react to light and accommodation.   NECK: Supple. No JVD  LYMPH NODES: No anterior cervical, posterior cervical, supraclavicular, axillary or inguinal adenopathy.   LUNGS: Normal breath sounds to auscultation and percussion. No rhonchi or rales.   BACK: No CVA or vertebral tenderness.   HEART: Regular rhythm without murmurs or gallops.   BREASTS: Bilateral mastectomies scars well healed  ABDOMEN: Without hepatosplenomegaly, masses, or tenderness. The patient has normal bowel sounds. No rigidity, guarding or tenderness.   EXTREMITIES: The patient has full range of motion. There is no lymphedema.   NEUROLOGIC: Cranial nerves II-XII are intact. Sensation is intact      CURRENT LAB DATA REVIEWED TODAY  Cbc diff/CMP marker are fine      Current image data reviewed today  dexa 3/2019 from [facility] - osteopenia is worse.       OLD DATA REVIEWED WITH " SUMMARY  3/2017 dexa from outside: nl. 2/2016 CXR nl  2/2015 dexa: borderline osteopenia  2012 dexa: osteopenia      A/P  1. Right breast cancer in 2012, T1b, triple positive, s/p mastectomy, TCH, currently on Arimidex since 2012.    We discussed the pros and cons of longer anti hormone therapy.   She made informed decision to stop AI.     We talked about the recurrence pattern associated with ER+ breast cancer and the followupplan.   She is due 12 months f/u with labs.         2. Osteopenia is worse on dexa 3/2019.    Adviced her to retry prolia.   She is to repeat dexa with her PMD at [place] in 2021.  Advice her to continue vit D and Ca.       Total visit time is 25 more than 15minutes spent in counseling about her disease status, duration of antihormone therapy, bone health issue, follow-up plan.                       Again, thank you for allowing me to participate in the care of your patient.        Sincerely,        Kristan Lim MD, MD

## 2020-10-21 NOTE — PROGRESS NOTES
"Oncology follow up visit  CC: right breast cancer T1b triple positive  HISTORY OF ONCOLOGY ILLNESS    She had undergone routine screening mammogram on 03/07/2012 demonstrating a suspicious lesion in the right breast. Biopsy confirmed infiltrating ductal adenocarcinoma.      She underwent a right mastectomy on 04/11/2012 demonstrating a 0.9 x 0.7 cm, grade 2 infiltrating ductal adenocarcinoma. Four examined lymph nodes were negative for metastatic disease. There was no lymphovascular invasion or DCIS. Estrogen and progesterone receptors were strongly positive. HER-2/zechariah was positive by FISH with a ratio of 6.1.     She went on to receive 6 cycles of TCH chemotherapy from 05/13/2012 through 08/16/2012. She then completed a full year of Herceptin on 04/25/2013.   She was placed on anastrozole 09/2012.      INTERVAL HISTORY:  She continues to tolerate AI fine. Dexa got worse in 3/2019. Prolia was resumed.        PMH:  bilateral carpal tunnel syndrome. This is being observed at the present time without surgery planned.   Osteopenia, HTN and hyperlipidemia, asthma, left breast mastectomy in 1996 for \"micalcification\" according to pt      ROS:   she is tolerating Arimidex well without added pain to her preexisting arthritis.   She has episodic pain on right frontal ankle.   No focal weakness or bleeding issues.   She last her .       PHYSICAL EXAMINATION:   GENERAL: Capri is well.   VITAL SIGNS: Blood pressure 126/76, pulse 63, temperature 97.8  F (36.6  C), temperature source Oral, resp. rate 16, weight 55.2 kg (121 lb 9.6 oz), last menstrual period 01/01/2000, SpO2 98 %.     ECOG 0    HEENT: The patient is normocephalic. Pupils are equal, round, and react to light and accommodation.   NECK: Supple. No JVD  LYMPH NODES: No anterior cervical, posterior cervical, supraclavicular, axillary or inguinal adenopathy.   LUNGS: Normal breath sounds to auscultation and percussion. No rhonchi or rales.   BACK: No CVA or " vertebral tenderness.   HEART: Regular rhythm without murmurs or gallops.   BREASTS: Bilateral mastectomies scars well healed  ABDOMEN: Without hepatosplenomegaly, masses, or tenderness. The patient has normal bowel sounds. No rigidity, guarding or tenderness.   EXTREMITIES: The patient has full range of motion. There is no lymphedema.   NEUROLOGIC: Cranial nerves II-XII are intact. Sensation is intact      CURRENT LAB DATA REVIEWED TODAY  Cbc diff/CMP marker are fine      Current image data reviewed today  [facility]a 3/2019 from [facility] - osteopenia is worse.       OLD DATA REVIEWED WITH SUMMARY  3/2017 dexa from outside: nl. 2/2016 CXR nl  2/2015 dexa: borderline osteopenia  2012 dexa: osteopenia      A/P  1. Right breast cancer in 2012, T1b, triple positive, s/p mastectomy, [facility], currently on Arimidex since 2012.    We discussed the pros and cons of longer anti hormone therapy.   She made informed decision to stop AI.     We talked about the recurrence pattern associated with ER+ breast cancer and the followupplan.   She is due 12 months f/u with labs.         2. Osteopenia is worse on dexa 3/2019.    Adviced her to retry prolia.   She is to repeat dexa with her PMD at [place] in 2021.  Advice her to continue vit D and Ca.       Total visit time is 25 more than 15minutes spent in counseling about her disease status, duration of antihormone therapy, bone health issue, follow-up plan.

## 2020-10-21 NOTE — PROGRESS NOTES
"Oncology Rooming Note    October 21, 2020 10:44 AM   Capri Chacon is a 74 year old female who presents for:    Chief Complaint   Patient presents with     Oncology Clinic Visit     Malignant neoplasm (H)     Initial Vitals: BP (!) 162/77 (BP Location: Left arm, Patient Position: Sitting, Cuff Size: Adult Regular)   Pulse 63   Temp 97.8  F (36.6  C) (Oral)   Resp 16   Wt 55.2 kg (121 lb 9.6 oz)   LMP 01/01/2000   SpO2 98%   BMI 24.56 kg/m   Estimated body mass index is 24.56 kg/m  as calculated from the following:    Height as of 10/9/19: 1.499 m (4' 11\").    Weight as of this encounter: 55.2 kg (121 lb 9.6 oz). Body surface area is 1.52 meters squared.  No Pain (0) Comment: Data Unavailable   Patient's last menstrual period was 01/01/2000.  Allergies reviewed: Yes  Medications reviewed: Yes    Medications: Medication refills not needed today.  Pharmacy name entered into [employer]: [facility] - [place], [place] - [address] AT PORTAL TO REGISTERED [facility] SITES    Clinical concerns: no      Yaima Sow CMA              "

## 2021-03-03 RX ORDER — DIPHENHYDRAMINE HYDROCHLORIDE 50 MG/ML
50 INJECTION INTRAMUSCULAR; INTRAVENOUS
Status: CANCELLED
Start: 2021-04-19

## 2021-03-03 RX ORDER — ALBUTEROL SULFATE 90 UG/1
1-2 AEROSOL, METERED RESPIRATORY (INHALATION)
Status: CANCELLED
Start: 2021-04-19

## 2021-03-03 RX ORDER — SODIUM CHLORIDE 9 MG/ML
INJECTION, SOLUTION INTRAVENOUS CONTINUOUS PRN
Status: CANCELLED
Start: 2021-04-19

## 2021-03-03 RX ORDER — METHYLPREDNISOLONE SODIUM SUCCINATE 125 MG/2ML
125 INJECTION, POWDER, LYOPHILIZED, FOR SOLUTION INTRAMUSCULAR; INTRAVENOUS
Status: CANCELLED
Start: 2021-04-19

## 2021-04-23 ENCOUNTER — INFUSION THERAPY VISIT (OUTPATIENT)
Dept: INFUSION THERAPY | Facility: CLINIC | Age: 76
End: 2021-04-23
Attending: INTERNAL MEDICINE
Payer: MEDICARE

## 2021-04-23 ENCOUNTER — HOSPITAL ENCOUNTER (OUTPATIENT)
Facility: CLINIC | Age: 76
Setting detail: SPECIMEN
Discharge: HOME | End: 2021-04-23
Attending: INTERNAL MEDICINE | Admitting: INTERNAL MEDICINE
Payer: MEDICARE

## 2021-04-23 DIAGNOSIS — C50.912 BILATERAL MALIGNANT NEOPLASM OF BREAST IN FEMALE, ESTROGEN RECEPTOR POSITIVE, UNSPECIFIED SITE OF BREAST (H): ICD-10-CM

## 2021-04-23 DIAGNOSIS — C50.911 BILATERAL MALIGNANT NEOPLASM OF BREAST IN FEMALE, ESTROGEN RECEPTOR POSITIVE, UNSPECIFIED SITE OF BREAST (H): ICD-10-CM

## 2021-04-23 DIAGNOSIS — M85.859 OSTEOPENIA OF HIP, UNSPECIFIED LATERALITY: Primary | ICD-10-CM

## 2021-04-23 DIAGNOSIS — Z17.0 BILATERAL MALIGNANT NEOPLASM OF BREAST IN FEMALE, ESTROGEN RECEPTOR POSITIVE, UNSPECIFIED SITE OF BREAST (H): ICD-10-CM

## 2021-04-23 LAB
BASOPHILS # BLD AUTO: 0 10E9/L (ref 0–0.2)
BASOPHILS NFR BLD AUTO: 0.5 %
CALCIUM SERPL-MCNC: 9.1 MG/DL (ref 8.5–10.1)
CANCER AG27-29 SERPL-ACNC: 14 U/ML (ref 0–39)
CREAT SERPL-MCNC: 1 MG/DL (ref 0.52–1.04)
DIFFERENTIAL METHOD BLD: NORMAL
EOSINOPHIL # BLD AUTO: 0.5 10E9/L (ref 0–0.7)
EOSINOPHIL NFR BLD AUTO: 6.1 %
ERYTHROCYTE [DISTWIDTH] IN BLOOD BY AUTOMATED COUNT: 13.2 % (ref 10–15)
GFR SERPL CREATININE-BSD FRML MDRD: 55 ML/MIN/{1.73_M2}
HCT VFR BLD AUTO: 37.8 % (ref 35–47)
HGB BLD-MCNC: 12.3 G/DL (ref 11.7–15.7)
IMM GRANULOCYTES # BLD: 0 10E9/L (ref 0–0.4)
IMM GRANULOCYTES NFR BLD: 0.1 %
LYMPHOCYTES # BLD AUTO: 2.9 10E9/L (ref 0.8–5.3)
LYMPHOCYTES NFR BLD AUTO: 37.9 %
MCH RBC QN AUTO: 30.5 PG (ref 26.5–33)
MCHC RBC AUTO-ENTMCNC: 32.5 G/DL (ref 31.5–36.5)
MCV RBC AUTO: 94 FL (ref 78–100)
MONOCYTES # BLD AUTO: 0.7 10E9/L (ref 0–1.3)
MONOCYTES NFR BLD AUTO: 8.7 %
NEUTROPHILS # BLD AUTO: 3.6 10E9/L (ref 1.6–8.3)
NEUTROPHILS NFR BLD AUTO: 46.7 %
NRBC # BLD AUTO: 0 10*3/UL
NRBC BLD AUTO-RTO: 0 /100
PLATELET # BLD AUTO: 272 10E9/L (ref 150–450)
RBC # BLD AUTO: 4.03 10E12/L (ref 3.8–5.2)
WBC # BLD AUTO: 7.7 10E9/L (ref 4–11)

## 2021-04-23 PROCEDURE — 86300 IMMUNOASSAY TUMOR CA 15-3: CPT | Performed by: INTERNAL MEDICINE

## 2021-04-23 PROCEDURE — 36415 COLL VENOUS BLD VENIPUNCTURE: CPT

## 2021-04-23 PROCEDURE — 82310 ASSAY OF CALCIUM: CPT | Performed by: INTERNAL MEDICINE

## 2021-04-23 PROCEDURE — 85025 COMPLETE CBC W/AUTO DIFF WBC: CPT | Performed by: INTERNAL MEDICINE

## 2021-04-23 PROCEDURE — 82565 ASSAY OF CREATININE: CPT | Performed by: INTERNAL MEDICINE

## 2021-04-26 ENCOUNTER — ALLIED HEALTH/NURSE VISIT (OUTPATIENT)
Dept: INFUSION THERAPY | Facility: CLINIC | Age: 76
End: 2021-04-26
Attending: INTERNAL MEDICINE
Payer: MEDICARE

## 2021-04-26 VITALS
TEMPERATURE: 97.7 F | RESPIRATION RATE: 18 BRPM | HEART RATE: 61 BPM | DIASTOLIC BLOOD PRESSURE: 69 MMHG | SYSTOLIC BLOOD PRESSURE: 136 MMHG

## 2021-04-26 DIAGNOSIS — Z17.0 BILATERAL MALIGNANT NEOPLASM OF BREAST IN FEMALE, ESTROGEN RECEPTOR POSITIVE, UNSPECIFIED SITE OF BREAST (H): ICD-10-CM

## 2021-04-26 DIAGNOSIS — M85.859 OSTEOPENIA OF HIP, UNSPECIFIED LATERALITY: Primary | ICD-10-CM

## 2021-04-26 DIAGNOSIS — C50.912 BILATERAL MALIGNANT NEOPLASM OF BREAST IN FEMALE, ESTROGEN RECEPTOR POSITIVE, UNSPECIFIED SITE OF BREAST (H): ICD-10-CM

## 2021-04-26 DIAGNOSIS — C50.911 BILATERAL MALIGNANT NEOPLASM OF BREAST IN FEMALE, ESTROGEN RECEPTOR POSITIVE, UNSPECIFIED SITE OF BREAST (H): ICD-10-CM

## 2021-04-26 PROCEDURE — 250N000011 HC RX IP 250 OP 636: Performed by: NURSE PRACTITIONER

## 2021-04-26 PROCEDURE — 96372 THER/PROPH/DIAG INJ SC/IM: CPT | Performed by: NURSE PRACTITIONER

## 2021-04-26 RX ORDER — AMLODIPINE BESYLATE 5 MG/1
5 TABLET ORAL
COMMUNITY
Start: 2021-03-29

## 2021-04-26 RX ADMIN — DENOSUMAB 60 MG: 60 INJECTION SUBCUTANEOUS at 09:30

## 2021-04-26 NOTE — PROGRESS NOTES
Infusion Nursing Note:  Capri REX Oswaldo presents today for prolia.    Patient seen by provider today: No   present during visit today: Not Applicable.    Note: N/A.    Intravenous Access:  No Intravenous access/labs at this visit.    Treatment Conditions:  Lab Results   Component Value Date     10/13/2020                   Lab Results   Component Value Date    POTASSIUM 4.1 10/13/2020           No results found for: MAG         Lab Results   Component Value Date    CR 1.00 04/23/2021                   Lab Results   Component Value Date    HUSAM 9.1 04/23/2021                Lab Results   Component Value Date    BILITOTAL 0.4 10/13/2020           Lab Results   Component Value Date    ALBUMIN 4.0 10/13/2020                    Lab Results   Component Value Date    ALT 22 10/13/2020           Lab Results   Component Value Date    AST 16 10/13/2020       Results reviewed, labs MET treatment parameters, ok to proceed with treatment.      Post Infusion Assessment:  Patient tolerated injection without incident.       Discharge Plan:   Discharge instructions reviewed with: Patient.  Patient and/or family verbalized understanding of discharge instructions and all questions answered.  Patient discharged in stable condition accompanied by: self.  Departure Mode: Ambulatory.    Tatyana Raman RN

## 2021-04-27 ENCOUNTER — TELEPHONE (OUTPATIENT)
Dept: ONCOLOGY | Facility: CLINIC | Age: 76
End: 2021-04-27

## 2021-04-27 DIAGNOSIS — C50.912 BILATERAL MALIGNANT NEOPLASM OF BREAST IN FEMALE, ESTROGEN RECEPTOR POSITIVE, UNSPECIFIED SITE OF BREAST (H): Primary | ICD-10-CM

## 2021-04-27 DIAGNOSIS — C50.911 BILATERAL MALIGNANT NEOPLASM OF BREAST IN FEMALE, ESTROGEN RECEPTOR POSITIVE, UNSPECIFIED SITE OF BREAST (H): Primary | ICD-10-CM

## 2021-04-27 DIAGNOSIS — Z17.0 BILATERAL MALIGNANT NEOPLASM OF BREAST IN FEMALE, ESTROGEN RECEPTOR POSITIVE, UNSPECIFIED SITE OF BREAST (H): Primary | ICD-10-CM

## 2021-04-27 NOTE — TELEPHONE ENCOUNTER
Signed order faxed to Leonard Novoa, NABILN, RN, OCN  Oncology Care Coordinator  [facility]

## 2021-04-27 NOTE — TELEPHONE ENCOUNTER
Capri called in to request a prescription for mastectomy bras and prosthesis.    She is a former patient of Dr. Lim and will establish care with Dr. Arriaga at her next return appointment on 10/29/2021.    She requests the prescription be sent to [facility] in [place], fax [phone].    Pended order routed to Dr. Arriaga to sign.    Darius Novoa, NABILN, RN, OCN  Oncology Care Coordinator  [facility]

## 2021-05-09 ENCOUNTER — HEALTH MAINTENANCE LETTER (OUTPATIENT)
Age: 76
End: 2021-05-09

## 2021-10-24 ENCOUNTER — HEALTH MAINTENANCE LETTER (OUTPATIENT)
Age: 76
End: 2021-10-24

## 2021-10-25 ENCOUNTER — LAB (OUTPATIENT)
Dept: INFUSION THERAPY | Facility: CLINIC | Age: 76
End: 2021-10-25
Attending: INTERNAL MEDICINE
Payer: MEDICARE

## 2021-10-25 DIAGNOSIS — Z85.3 PERSONAL HISTORY OF MALIGNANT NEOPLASM OF BREAST: ICD-10-CM

## 2021-10-25 LAB
ALBUMIN SERPL-MCNC: 3.8 G/DL (ref 3.4–5)
ALP SERPL-CCNC: 53 U/L (ref 40–150)
ALT SERPL W P-5'-P-CCNC: 32 U/L (ref 0–50)
ANION GAP SERPL CALCULATED.3IONS-SCNC: <1 MMOL/L (ref 3–14)
AST SERPL W P-5'-P-CCNC: 17 U/L (ref 0–45)
BASOPHILS # BLD AUTO: 0.1 10E3/UL (ref 0–0.2)
BASOPHILS NFR BLD AUTO: 1 %
BILIRUB SERPL-MCNC: 0.4 MG/DL (ref 0.2–1.3)
BUN SERPL-MCNC: 18 MG/DL (ref 7–30)
CALCIUM SERPL-MCNC: 8.9 MG/DL (ref 8.5–10.1)
CANCER AG27-29 SERPL-ACNC: 8 U/ML (ref 0–39)
CHLORIDE BLD-SCNC: 108 MMOL/L (ref 94–109)
CO2 SERPL-SCNC: 31 MMOL/L (ref 20–32)
CREAT SERPL-MCNC: 0.86 MG/DL (ref 0.52–1.04)
EOSINOPHIL # BLD AUTO: 0.7 10E3/UL (ref 0–0.7)
EOSINOPHIL NFR BLD AUTO: 8 %
ERYTHROCYTE [DISTWIDTH] IN BLOOD BY AUTOMATED COUNT: 12.9 % (ref 10–15)
GFR SERPL CREATININE-BSD FRML MDRD: 67 ML/MIN/1.73M2
GLUCOSE BLD-MCNC: 100 MG/DL (ref 70–99)
HCT VFR BLD AUTO: 38.1 % (ref 35–47)
HGB BLD-MCNC: 12.4 G/DL (ref 11.7–15.7)
IMM GRANULOCYTES # BLD: 0 10E3/UL
IMM GRANULOCYTES NFR BLD: 0 %
LYMPHOCYTES # BLD AUTO: 3 10E3/UL (ref 0.8–5.3)
LYMPHOCYTES NFR BLD AUTO: 36 %
MCH RBC QN AUTO: 30.8 PG (ref 26.5–33)
MCHC RBC AUTO-ENTMCNC: 32.5 G/DL (ref 31.5–36.5)
MCV RBC AUTO: 95 FL (ref 78–100)
MONOCYTES # BLD AUTO: 0.9 10E3/UL (ref 0–1.3)
MONOCYTES NFR BLD AUTO: 10 %
NEUTROPHILS # BLD AUTO: 3.8 10E3/UL (ref 1.6–8.3)
NEUTROPHILS NFR BLD AUTO: 45 %
NRBC # BLD AUTO: 0 10E3/UL
NRBC BLD AUTO-RTO: 0 /100
PLATELET # BLD AUTO: 254 10E3/UL (ref 150–450)
POTASSIUM BLD-SCNC: 3.9 MMOL/L (ref 3.4–5.3)
PROT SERPL-MCNC: 6.8 G/DL (ref 6.8–8.8)
RBC # BLD AUTO: 4.02 10E6/UL (ref 3.8–5.2)
SODIUM SERPL-SCNC: 139 MMOL/L (ref 133–144)
WBC # BLD AUTO: 8.5 10E3/UL (ref 4–11)

## 2021-10-25 PROCEDURE — 36415 COLL VENOUS BLD VENIPUNCTURE: CPT

## 2021-10-25 PROCEDURE — 86300 IMMUNOASSAY TUMOR CA 15-3: CPT | Performed by: INTERNAL MEDICINE

## 2021-10-25 PROCEDURE — 85025 COMPLETE CBC W/AUTO DIFF WBC: CPT | Performed by: INTERNAL MEDICINE

## 2021-10-25 PROCEDURE — 82040 ASSAY OF SERUM ALBUMIN: CPT | Performed by: INTERNAL MEDICINE

## 2021-10-29 ENCOUNTER — ONCOLOGY VISIT (OUTPATIENT)
Dept: ONCOLOGY | Facility: CLINIC | Age: 76
End: 2021-10-29
Attending: INTERNAL MEDICINE
Payer: MEDICARE

## 2021-10-29 VITALS
SYSTOLIC BLOOD PRESSURE: 120 MMHG | DIASTOLIC BLOOD PRESSURE: 69 MMHG | WEIGHT: 128 LBS | HEIGHT: 59 IN | BODY MASS INDEX: 25.8 KG/M2 | HEART RATE: 62 BPM | TEMPERATURE: 98 F | OXYGEN SATURATION: 98 % | RESPIRATION RATE: 16 BRPM

## 2021-10-29 DIAGNOSIS — C50.911 BILATERAL MALIGNANT NEOPLASM OF BREAST IN FEMALE, ESTROGEN RECEPTOR POSITIVE, UNSPECIFIED SITE OF BREAST (H): ICD-10-CM

## 2021-10-29 DIAGNOSIS — M85.859 OSTEOPENIA OF HIP, UNSPECIFIED LATERALITY: Primary | ICD-10-CM

## 2021-10-29 DIAGNOSIS — Z17.0 BILATERAL MALIGNANT NEOPLASM OF BREAST IN FEMALE, ESTROGEN RECEPTOR POSITIVE, UNSPECIFIED SITE OF BREAST (H): ICD-10-CM

## 2021-10-29 DIAGNOSIS — C50.912 BILATERAL MALIGNANT NEOPLASM OF BREAST IN FEMALE, ESTROGEN RECEPTOR POSITIVE, UNSPECIFIED SITE OF BREAST (H): ICD-10-CM

## 2021-10-29 PROCEDURE — 99214 OFFICE O/P EST MOD 30 MIN: CPT | Performed by: INTERNAL MEDICINE

## 2021-10-29 PROCEDURE — 96372 THER/PROPH/DIAG INJ SC/IM: CPT | Performed by: INTERNAL MEDICINE

## 2021-10-29 PROCEDURE — G0463 HOSPITAL OUTPT CLINIC VISIT: HCPCS | Mod: 25

## 2021-10-29 PROCEDURE — 250N000011 HC RX IP 250 OP 636: Performed by: INTERNAL MEDICINE

## 2021-10-29 RX ORDER — METHYLPREDNISOLONE SODIUM SUCCINATE 125 MG/2ML
125 INJECTION, POWDER, LYOPHILIZED, FOR SOLUTION INTRAMUSCULAR; INTRAVENOUS
Status: CANCELLED
Start: 2021-10-29

## 2021-10-29 RX ORDER — SODIUM CHLORIDE 9 MG/ML
INJECTION, SOLUTION INTRAVENOUS CONTINUOUS PRN
Status: CANCELLED
Start: 2021-10-29

## 2021-10-29 RX ORDER — DIPHENHYDRAMINE HYDROCHLORIDE 50 MG/ML
50 INJECTION INTRAMUSCULAR; INTRAVENOUS
Status: CANCELLED
Start: 2021-10-29

## 2021-10-29 RX ORDER — ALBUTEROL SULFATE 90 UG/1
1-2 AEROSOL, METERED RESPIRATORY (INHALATION)
Status: CANCELLED
Start: 2021-10-29

## 2021-10-29 RX ADMIN — DENOSUMAB 60 MG: 60 INJECTION SUBCUTANEOUS at 09:49

## 2021-10-29 ASSESSMENT — MIFFLIN-ST. JEOR: SCORE: 986.23

## 2021-10-29 ASSESSMENT — PAIN SCALES - GENERAL: PAINLEVEL: NO PAIN (0)

## 2021-10-29 NOTE — PROGRESS NOTES
"Oncology Rooming Note    October 29, 2021 8:46 AM   Capri Chacon is a 75 year old female who presents for:    Chief Complaint   Patient presents with     Oncology Clinic Visit     Initial Vitals: LMP 01/01/2000  Estimated body mass index is 24.56 kg/m  as calculated from the following:    Height as of 10/9/19: 1.499 m (4' 11\").    Weight as of 10/21/20: 55.2 kg (121 lb 9.6 oz). There is no height or weight on file to calculate BSA.  Data Unavailable Comment: Data Unavailable   Patient's last menstrual period was 01/01/2000.  Allergies reviewed: Yes  Medications reviewed: Yes    Medications: Medication refills not needed today.  Pharmacy [facility] entered into [employer]: [facility] [place] [place] - [address] AT PORTAL TO REGISTERED [facility] SITES    Clinical concerns:  doctor was notified.      Madhuri Posadas MA            "

## 2021-10-29 NOTE — LETTER
"    10/29/2021         RE: Capri Chacon  [address] [facility] [address]        Dear Colleague,    Thank you for referring your patient, Capri Chacon, to the [facility]. Please see a copy of my visit note below.    Oncology Rooming Note    October 29, 2021 8:46 AM   Capri Chacon is a 75 year old female who presents for:    Chief Complaint   Patient presents with     Oncology Clinic Visit     Initial Vitals: LMP 01/01/2000  Estimated body mass index is 24.56 kg/m  as calculated from the following:    Height as of 10/9/19: 1.499 m (4' 11\").    Weight as of 10/21/20: 55.2 kg (121 lb 9.6 oz). There is no height or weight on file to calculate BSA.  Data Unavailable Comment: Data Unavailable   Patient's last menstrual period was 01/01/2000.  Allergies reviewed: Yes  Medications reviewed: Yes    Medications: Medication refills not needed today.  Pharmacy name entered into [employer]: [facility] - [place], [place] - [address] AT PORTAL TO REGISTERED [facility]    Clinical concerns:  doctor was notified.      Madhuri Posadas MA              [facility]    Hematology/Oncology Established Patient Follow-up Note      Today's Date: 10/29/21    Reason for Follow-up: right breast cancer T1b triple positive    HISTORY OF PRESENT ILLNESS: Capri Chacon is a 75 year old female who presents with history of right breast cancer.  She was previously a patient of Dr. Lim.    She had undergone routine screening mammogram on 03/07/2012 demonstrating a suspicious lesion in the right breast. Biopsy confirmed infiltrating ductal adenocarcinoma.      She underwent a right mastectomy on 04/11/2012 demonstrating a 0.9 x 0.7 cm, grade 2 infiltrating ductal adenocarcinoma. Four examined lymph nodes were negative for metastatic disease. There was no lymphovascular invasion or DCIS. Estrogen and progesterone receptors were strongly " positive. HER-2/zechariah was positive by FISH with a ratio of 6.1.      She went on to receive 6 cycles of TCH chemotherapy from 05/13/2012 through 08/16/2012. She then completed a full year of Herceptin on 04/25/2013.   She was placed on anastrozole 09/2012.  It was stopped in October 2020.      INTERIM HISTORY: I met Capri for the first time today.  She was previously a patient of Dr. Lim, who has left our practice.  She is doing well.  She has no new complaints today.        REVIEW OF SYSTEMS:   14 point ROS was reviewed and is negative other than as noted above in HPI.       HOME MEDICATIONS:  Current Outpatient Medications   Medication Sig Dispense Refill     amLODIPine (NORVASC) 5 MG tablet Take 5 mg by mouth       ASPIRIN PO Take 81 mg by mouth daily       calcium-vitamin D 500-125 MG-UNIT TABS Take 1 tablet by mouth 2 times daily       denosumab (PROLIA) 60 MG/ML SOSY injection Inject 60 mg Subcutaneous every 6 months       Fluticasone-Salmeterol (ADVAIR HFA IN) Inhale into the lungs daily        METFORMIN HCL PO Take 500 mg by mouth       Metoprolol Tartrate (LOPRESSOR PO) Take 50 mg by mouth daily        simvastatin (ZOCOR) 40 MG tablet Take 20 mg by mouth At Bedtime            ALLERGIES:  Allergies   Allergen Reactions     Erythromycin Other (See Comments)     Stomach ache     Lisinopril Cough         PAST MEDICAL HISTORY:  Past Medical History:   Diagnosis Date     Asthma      Hypertension      Malignant neoplasm (H)          PAST SURGICAL HISTORY:  Past Surgical History:   Procedure Laterality Date     MASTECTOMY           SOCIAL HISTORY:  Social History     Socioeconomic History     Marital status:      Spouse name: Not on file     Number of children: Not on file     Years of education: Not on file     Highest education level: Not on file   Occupational History     Not on file   Tobacco Use     Smoking status: Never Smoker     Smokeless tobacco: Never Used   Substance and Sexual Activity     Alcohol  "use: No     Drug use: No     Sexual activity: Yes   Other Topics Concern     Parent/sibling w/ CABG, MI or angioplasty before 65F 55M? Not Asked   Social History Narrative     Not on file     Social Determinants of Health     Financial Resource Strain:      Difficulty of Paying Living Expenses:    Food Insecurity:      Worried About Running Out of Food in the Last Year:      Ran Out of Food in the Last Year:    Transportation Needs:      Lack of Transportation (Medical):      Lack of Transportation (Non-Medical):    Physical Activity:      Days of Exercise per Week:      Minutes of Exercise per Session:    Stress:      Feeling of Stress :    Social Connections:      Frequency of Communication with Friends and Family:      Frequency of Social Gatherings with Friends and Family:      Attends Tenriism Services:      Active Member of Clubs or Organizations:      Attends Club or Organization Meetings:      Marital Status:    Intimate Partner Violence:      Fear of Current or Ex-Partner:      Emotionally Abused:      Physically Abused:      Sexually Abused:          FAMILY HISTORY:  Family History   Problem Relation Age of Onset     Hypertension Mother      Cancer Father      Breast Cancer Sister      Breast Cancer Other      Cardiovascular Other          PHYSICAL EXAM:  Vital signs:  /69   Pulse 62   Temp 98  F (36.7  C)   Resp 16   Ht 1.499 m (4' 11\")   Wt 58.1 kg (128 lb)   LMP 2000   SpO2 98%   BMI 25.85 kg/m     ECO  GENERAL/CONSTITUTIONAL: No acute distress.  EYES: No scleral icterus.  LYMPH: No anterior cervical, posterior cervical, supraclavicular, or axillary adenopathy.   RESPIRATORY: Clear to auscultation bilaterally. No crackles or wheezing.   CARDIOVASCULAR: Regular rate and rhythm without murmurs, gallops, or rubs.  GASTROINTESTINAL: No tenderness. No guarding.   BREAST: s/p bilateral mastectomies.  MUSCULOSKELETAL: Warm and well-perfused.  NEUROLOGIC: Alert, oriented, answers " questions appropriately.  INTEGUMENTARY: No jaundice.      LABS:  CBC RESULTS: Recent Labs   Lab Test 10/25/21  0928   WBC 8.5   RBC 4.02   HGB 12.4   HCT 38.1   MCV 95   MCH 30.8   MCHC 32.5   RDW 12.9        Recent Labs   Lab Test 10/25/21  0928 04/23/21  0832 10/13/20  0807 10/13/20  0807     --   --  141   POTASSIUM 3.9  --   --  4.1   CHLORIDE 108  --   --  108   CO2 31  --   --  29   ANIONGAP <1*  --   --  4   *  --   --  102*   BUN 18  --   --  17   CR 0.86 1.00   < > 0.85   HUSAM 8.9 9.1   < > 9.0    < > = values in this interval not displayed.     Lab Results   Component Value Date    AST 17 10/25/2021    AST 16 10/13/2020     Lab Results   Component Value Date    ALT 32 10/25/2021    ALT 22 10/13/2020     No results found for: BILICONJ   Lab Results   Component Value Date    BILITOTAL 0.4 10/25/2021    BILITOTAL 0.4 10/13/2020     Lab Results   Component Value Date    ALBUMIN 3.8 10/25/2021    ALBUMIN 4.0 10/13/2020     Lab Results   Component Value Date    PROTTOTAL 6.8 10/25/2021    PROTTOTAL 6.7 10/13/2020      Lab Results   Component Value Date    ALKPHOS 53 10/25/2021    ALKPHOS 56 10/13/2020     Component      Latest Ref Rng & Units 10/10/2018 10/9/2019 4/9/2020 10/13/2020   CA 27-29      0 - 39 U/mL 10 6 <5 8     Component      Latest Ref Rng & Units 4/23/2021 10/25/2021   CA 27-29      0 - 39 U/mL 14 8         ASSESSMENT/PLAN:  Capri Chacon is a 75 year old female with:    1) Right breast cancer in 2012, T1b, triple positive, s/p mastectomy, TCH, was on Arimidex since 2012, completed 8 years of treatment in October 2020.     She is almost 10 years out from her diagnosis and surgery.  She is no longer on hormonal therapy.  At this point, she can be discharged back to her PCP, and she is comfortable with this.    -follow-up in oncology clinic as needed       2) Osteopenia: Last DEXA scan was on 3/19/21 at [facility], which showed osteopenia that is stable/improved.  -on  calcium and vitamin D  -she has been on Prolia every 6 months - she will get a dose today and then stop.  -since she will no longer on aromatase inhibitor, and will no longer need to follow-up in oncology clinic regularly, she can resume follow-up and management of this with her PCP going forward.  Capri is agreeable with this plan.    3) HTN, HLD:  -follow-up with PCP      Yolande Arriaga MD  Hematology/Oncology  [facility]    Total time spent on day of visit, including review of tests, obtaining/reviewing separately obtained history, ordering medications/tests/procedures, communicating with PCP/consultants, and documenting in electronic medical record: 30 minutes        Again, thank you for allowing me to participate in the care of your patient.        Sincerely,        Yolande Arriaga MD

## 2021-10-29 NOTE — PROGRESS NOTES
[facility]    Hematology/Oncology Established Patient Follow-up Note      Today's Date: 10/29/21    Reason for Follow-up: right breast cancer T1b triple positive    HISTORY OF PRESENT ILLNESS: Capri Chacon is a 75 year old female who presents with history of right breast cancer.  She was previously a patient of Dr. Lim.    She had undergone routine screening mammogram on 03/07/2012 demonstrating a suspicious lesion in the right breast. Biopsy confirmed infiltrating ductal adenocarcinoma.      She underwent a right mastectomy on 04/11/2012 demonstrating a 0.9 x 0.7 cm, grade 2 infiltrating ductal adenocarcinoma. Four examined lymph nodes were negative for metastatic disease. There was no lymphovascular invasion or DCIS. Estrogen and progesterone receptors were strongly positive. HER-2/zechariah was positive by FISH with a ratio of 6.1.      She went on to receive 6 cycles of TCH chemotherapy from 05/13/2012 through 08/16/2012. She then completed a full year of Herceptin on 04/25/2013.   She was placed on anastrozole 09/2012.  It was stopped in October 2020.      INTERIM HISTORY: I met Capri for the first time today.  She was previously a patient of Dr. Lim, who has left our practice.  She is doing well.  She has no new complaints today.        REVIEW OF SYSTEMS:   14 point ROS was reviewed and is negative other than as noted above in HPI.       HOME MEDICATIONS:  Current Outpatient Medications   Medication Sig Dispense Refill     amLODIPine (NORVASC) 5 MG tablet Take 5 mg by mouth       ASPIRIN PO Take 81 mg by mouth daily       calcium-vitamin D 500-125 MG-UNIT TABS Take 1 tablet by mouth 2 times daily       denosumab (PROLIA) 60 MG/ML SOSY injection Inject 60 mg Subcutaneous every 6 months       Fluticasone-Salmeterol (ADVAIR HFA IN) Inhale into the lungs daily        METFORMIN HCL PO Take 500 mg by mouth       Metoprolol Tartrate (LOPRESSOR PO) Take 50 mg by mouth daily        simvastatin  (ZOCOR) 40 MG tablet Take 20 mg by mouth At Bedtime            ALLERGIES:  Allergies   Allergen Reactions     Erythromycin Other (See Comments)     Stomach ache     Lisinopril Cough         PAST MEDICAL HISTORY:  Past Medical History:   Diagnosis Date     Asthma      Hypertension      Malignant neoplasm (H)          PAST SURGICAL HISTORY:  Past Surgical History:   Procedure Laterality Date     MASTECTOMY           SOCIAL HISTORY:  Social History     Socioeconomic History     Marital status:      Spouse name: Not on file     Number of children: Not on file     Years of education: Not on file     Highest education level: Not on file   Occupational History     Not on file   Tobacco Use     Smoking status: Never Smoker     Smokeless tobacco: Never Used   Substance and Sexual Activity     Alcohol use: No     Drug use: No     Sexual activity: Yes   Other Topics Concern     Parent/sibling w/ CABG, MI or angioplasty before 65F 55M? Not Asked   Social History Narrative     Not on file     Social Determinants of Health     Financial Resource Strain:      Difficulty of Paying Living Expenses:    Food Insecurity:      Worried About Running Out of Food in the Last Year:      Ran Out of Food in the Last Year:    Transportation Needs:      Lack of Transportation (Medical):      Lack of Transportation (Non-Medical):    Physical Activity:      Days of Exercise per Week:      Minutes of Exercise per Session:    Stress:      Feeling of Stress :    Social Connections:      Frequency of Communication with Friends and Family:      Frequency of Social Gatherings with Friends and Family:      Attends Anabaptism Services:      Active Member of Clubs or Organizations:      Attends Club or Organization Meetings:      Marital Status:    Intimate Partner Violence:      Fear of Current or Ex-Partner:      Emotionally Abused:      Physically Abused:      Sexually Abused:          FAMILY HISTORY:  Family History   Problem Relation Age of  "Onset     Hypertension Mother      Cancer Father      Breast Cancer Sister      Breast Cancer Other      Cardiovascular Other          PHYSICAL EXAM:  Vital signs:  /69   Pulse 62   Temp 98  F (36.7  C)   Resp 16   Ht 1.499 m (4' 11\")   Wt 58.1 kg (128 lb)   LMP 2000   SpO2 98%   BMI 25.85 kg/m     ECO  GENERAL/CONSTITUTIONAL: No acute distress.  EYES: No scleral icterus.  LYMPH: No anterior cervical, posterior cervical, supraclavicular, or axillary adenopathy.   RESPIRATORY: Clear to auscultation bilaterally. No crackles or wheezing.   CARDIOVASCULAR: Regular rate and rhythm without murmurs, gallops, or rubs.  GASTROINTESTINAL: No tenderness. No guarding.   BREAST: s/p bilateral mastectomies.  MUSCULOSKELETAL: Warm and well-perfused.  NEUROLOGIC: Alert, oriented, answers questions appropriately.  INTEGUMENTARY: No jaundice.      LABS:  CBC RESULTS: Recent Labs   Lab Test 10/25/21  0928   WBC 8.5   RBC 4.02   HGB 12.4   HCT 38.1   MCV 95   MCH 30.8   MCHC 32.5   RDW 12.9        Recent Labs   Lab Test 10/25/21  0928 21  0832 10/13/20  0807 10/13/20  0807     --   --  141   POTASSIUM 3.9  --   --  4.1   CHLORIDE 108  --   --  108   CO2 31  --   --  29   ANIONGAP <1*  --   --  4   *  --   --  102*   BUN 18  --   --  17   CR 0.86 1.00   < > 0.85   HUSAM 8.9 9.1   < > 9.0    < > = values in this interval not displayed.     Lab Results   Component Value Date    AST 17 10/25/2021    AST 16 10/13/2020     Lab Results   Component Value Date    ALT 32 10/25/2021    ALT 22 10/13/2020     No results found for: BILICONJ   Lab Results   Component Value Date    BILITOTAL 0.4 10/25/2021    BILITOTAL 0.4 10/13/2020     Lab Results   Component Value Date    ALBUMIN 3.8 10/25/2021    ALBUMIN 4.0 10/13/2020     Lab Results   Component Value Date    PROTTOTAL 6.8 10/25/2021    PROTTOTAL 6.7 10/13/2020      Lab Results   Component Value Date    ALKPHOS 53 10/25/2021    ALKPHOS 56 10/13/2020 "     Component      Latest Ref Rng & Units 10/10/2018 10/9/2019 4/9/2020 10/13/2020   CA 27-29      0 - 39 U/mL 10 6 <5 8     Component      Latest Ref Rng & Units 4/23/2021 10/25/2021   CA 27-29      0 - 39 U/mL 14 8         ASSESSMENT/PLAN:  Capri Chacon is a 75 year old female with:    1) Right breast cancer in 2012, T1b, triple positive, s/p mastectomy, TCH, was on Arimidex since 2012, completed 8 years of treatment in October 2020.     She is almost 10 years out from her diagnosis and surgery.  She is no longer on hormonal therapy.  At this point, she can be discharged back to her PCP, and she is comfortable with this.    -follow-up in oncology clinic as needed       2) Osteopenia: Last DEXA scan was on 3/19/21 at [facility], which showed osteopenia that is stable/improved.  -on calcium and vitamin D  -she has been on Prolia every 6 months - she will get a dose today and then stop.  -since she will no longer on aromatase inhibitor, and will no longer need to follow-up in oncology clinic regularly, she can resume follow-up and management of this with her PCP going forward.  Capri is agreeable with this plan.    3) HTN, HLD:  -follow-up with PCP      Yolande Arriaga MD  Hematology/Oncology  [facility]    Total time spent on day of visit, including review of tests, obtaining/reviewing separately obtained history, ordering medications/tests/procedures, communicating with PCP/consultants, and documenting in electronic medical record: 30 minutes

## 2022-04-10 ENCOUNTER — HEALTH MAINTENANCE LETTER (OUTPATIENT)
Age: 77
End: 2022-04-10

## 2022-06-05 ENCOUNTER — HEALTH MAINTENANCE LETTER (OUTPATIENT)
Age: 77
End: 2022-06-05

## 2022-10-15 ENCOUNTER — HEALTH MAINTENANCE LETTER (OUTPATIENT)
Age: 77
End: 2022-10-15

## 2022-11-27 ENCOUNTER — HEALTH MAINTENANCE LETTER (OUTPATIENT)
Age: 77
End: 2022-11-27

## 2024-01-07 ENCOUNTER — HEALTH MAINTENANCE LETTER (OUTPATIENT)
Age: 79
End: 2024-01-07

## 2024-03-17 ENCOUNTER — HEALTH MAINTENANCE LETTER (OUTPATIENT)
Age: 79
End: 2024-03-17